# Patient Record
Sex: MALE | HISPANIC OR LATINO | Employment: FULL TIME | URBAN - METROPOLITAN AREA
[De-identification: names, ages, dates, MRNs, and addresses within clinical notes are randomized per-mention and may not be internally consistent; named-entity substitution may affect disease eponyms.]

---

## 2017-09-01 ENCOUNTER — HOSPITAL ENCOUNTER (OUTPATIENT)
Dept: LAB | Facility: MEDICAL CENTER | Age: 37
End: 2017-09-01
Attending: HOSPITALIST
Payer: COMMERCIAL

## 2017-09-01 LAB
EST. AVERAGE GLUCOSE BLD GHB EST-MCNC: 143 MG/DL
HBA1C MFR BLD: 6.6 % (ref 0–5.6)

## 2017-09-01 PROCEDURE — 36415 COLL VENOUS BLD VENIPUNCTURE: CPT

## 2017-09-01 PROCEDURE — 83036 HEMOGLOBIN GLYCOSYLATED A1C: CPT

## 2018-06-27 ENCOUNTER — HOSPITAL ENCOUNTER (OUTPATIENT)
Dept: LAB | Facility: MEDICAL CENTER | Age: 38
End: 2018-06-27
Attending: INTERNAL MEDICINE
Payer: COMMERCIAL

## 2018-06-27 LAB
ALBUMIN SERPL BCP-MCNC: 4.3 G/DL (ref 3.2–4.9)
ALBUMIN/GLOB SERPL: 1.3 G/DL
ALP SERPL-CCNC: 49 U/L (ref 30–99)
ALT SERPL-CCNC: 58 U/L (ref 2–50)
ANION GAP SERPL CALC-SCNC: 8 MMOL/L (ref 0–11.9)
APPEARANCE UR: CLEAR
AST SERPL-CCNC: 31 U/L (ref 12–45)
BACTERIA #/AREA URNS HPF: NEGATIVE /HPF
BASOPHILS # BLD AUTO: 0.7 % (ref 0–1.8)
BASOPHILS # BLD: 0.06 K/UL (ref 0–0.12)
BILIRUB SERPL-MCNC: 1 MG/DL (ref 0.1–1.5)
BILIRUB UR QL STRIP.AUTO: NEGATIVE
BUN SERPL-MCNC: 16 MG/DL (ref 8–22)
CALCIUM SERPL-MCNC: 9.6 MG/DL (ref 8.5–10.5)
CHLORIDE SERPL-SCNC: 104 MMOL/L (ref 96–112)
CO2 SERPL-SCNC: 27 MMOL/L (ref 20–33)
COLOR UR: YELLOW
CREAT SERPL-MCNC: 0.66 MG/DL (ref 0.5–1.4)
EOSINOPHIL # BLD AUTO: 0.3 K/UL (ref 0–0.51)
EOSINOPHIL NFR BLD: 3.3 % (ref 0–6.9)
EPI CELLS #/AREA URNS HPF: ABNORMAL /HPF
ERYTHROCYTE [DISTWIDTH] IN BLOOD BY AUTOMATED COUNT: 41.5 FL (ref 35.9–50)
EST. AVERAGE GLUCOSE BLD GHB EST-MCNC: 154 MG/DL
GLOBULIN SER CALC-MCNC: 3.4 G/DL (ref 1.9–3.5)
GLUCOSE SERPL-MCNC: 155 MG/DL (ref 65–99)
GLUCOSE UR STRIP.AUTO-MCNC: NEGATIVE MG/DL
HBA1C MFR BLD: 7 % (ref 0–5.6)
HCT VFR BLD AUTO: 49.9 % (ref 42–52)
HGB BLD-MCNC: 16.7 G/DL (ref 14–18)
HYALINE CASTS #/AREA URNS LPF: ABNORMAL /LPF
IMM GRANULOCYTES # BLD AUTO: 0.09 K/UL (ref 0–0.11)
IMM GRANULOCYTES NFR BLD AUTO: 1 % (ref 0–0.9)
KETONES UR STRIP.AUTO-MCNC: NEGATIVE MG/DL
LEUKOCYTE ESTERASE UR QL STRIP.AUTO: ABNORMAL
LYMPHOCYTES # BLD AUTO: 2.51 K/UL (ref 1–4.8)
LYMPHOCYTES NFR BLD: 27.6 % (ref 22–41)
MCH RBC QN AUTO: 29.2 PG (ref 27–33)
MCHC RBC AUTO-ENTMCNC: 33.5 G/DL (ref 33.7–35.3)
MCV RBC AUTO: 87.4 FL (ref 81.4–97.8)
MICRO URNS: ABNORMAL
MONOCYTES # BLD AUTO: 0.65 K/UL (ref 0–0.85)
MONOCYTES NFR BLD AUTO: 7.1 % (ref 0–13.4)
NEUTROPHILS # BLD AUTO: 5.49 K/UL (ref 1.82–7.42)
NEUTROPHILS NFR BLD: 60.3 % (ref 44–72)
NITRITE UR QL STRIP.AUTO: NEGATIVE
NRBC # BLD AUTO: 0 K/UL
NRBC BLD-RTO: 0 /100 WBC
PH UR STRIP.AUTO: 5.5 [PH]
PLATELET # BLD AUTO: 238 K/UL (ref 164–446)
PMV BLD AUTO: 11.5 FL (ref 9–12.9)
POTASSIUM SERPL-SCNC: 4.1 MMOL/L (ref 3.6–5.5)
PROT SERPL-MCNC: 7.7 G/DL (ref 6–8.2)
PROT UR QL STRIP: 30 MG/DL
RBC # BLD AUTO: 5.71 M/UL (ref 4.7–6.1)
RBC # URNS HPF: ABNORMAL /HPF
RBC UR QL AUTO: NEGATIVE
SODIUM SERPL-SCNC: 139 MMOL/L (ref 135–145)
SP GR UR STRIP.AUTO: 1.03
UROBILINOGEN UR STRIP.AUTO-MCNC: 0.2 MG/DL
WBC # BLD AUTO: 9.1 K/UL (ref 4.8–10.8)
WBC #/AREA URNS HPF: ABNORMAL /HPF

## 2018-06-27 PROCEDURE — 81001 URINALYSIS AUTO W/SCOPE: CPT

## 2018-06-27 PROCEDURE — 83036 HEMOGLOBIN GLYCOSYLATED A1C: CPT

## 2018-06-27 PROCEDURE — 85025 COMPLETE CBC W/AUTO DIFF WBC: CPT

## 2018-06-27 PROCEDURE — 36415 COLL VENOUS BLD VENIPUNCTURE: CPT

## 2018-06-27 PROCEDURE — 80053 COMPREHEN METABOLIC PANEL: CPT

## 2018-12-06 ENCOUNTER — HOSPITAL ENCOUNTER (EMERGENCY)
Facility: MEDICAL CENTER | Age: 38
End: 2018-12-06
Attending: EMERGENCY MEDICINE
Payer: COMMERCIAL

## 2018-12-06 VITALS
DIASTOLIC BLOOD PRESSURE: 74 MMHG | WEIGHT: 270.73 LBS | TEMPERATURE: 97.5 F | OXYGEN SATURATION: 98 % | HEART RATE: 91 BPM | BODY MASS INDEX: 42.49 KG/M2 | RESPIRATION RATE: 16 BRPM | SYSTOLIC BLOOD PRESSURE: 121 MMHG | HEIGHT: 67 IN

## 2018-12-06 DIAGNOSIS — I83.899 BLEEDING FROM VARICOSE VEIN: ICD-10-CM

## 2018-12-06 PROCEDURE — 99283 EMERGENCY DEPT VISIT LOW MDM: CPT

## 2018-12-06 RX ORDER — LISINOPRIL 10 MG/1
10 TABLET ORAL DAILY
COMMUNITY

## 2018-12-06 ASSESSMENT — PAIN SCALES - GENERAL
PAINLEVEL_OUTOF10: 0

## 2018-12-06 ASSESSMENT — LIFESTYLE VARIABLES: DO YOU DRINK ALCOHOL: NO

## 2018-12-06 NOTE — ED NOTES
Pt given discharge instructions and wound care instructions, he verbalizes understanding of instructions and follow up plan

## 2018-12-06 NOTE — ED PROVIDER NOTES
"ER Provider Note     Scribed for Chaz Lopez M.D. by Emy Collado. 12/6/2018, 3:28 AM.    Primary Care Provider: James Raymond M.D.  Means of Arrival: walk in    History obtained from: Patient  History limited by: None     CHIEF COMPLAINT  Chief Complaint   Patient presents with   • Open Wound     burst vericose vein       HPI  Bautista Mcdermott is a 38 y.o. male who presents to the Emergency Department with complaints of an open wound to his left lower extremity onset tonight. He has varicose veins and notes that his leg was bleeding yesterday and he controlled it with direct pressure and a dressing. Tonight, his leg began to bleed in abundance, so he came here for evaluation. He reports that his primary care physician suggested that he visits a vascular specialist, but he has not yet.       REVIEW OF SYSTEMS  See HPI for further details. All other systems are negative.     PAST MEDICAL HISTORY   has a past medical history of Diabetes (HCC); Hyperlipemia; and Hypertension.    SURGICAL HISTORY  patient denies any surgical history    SOCIAL HISTORY  Social History   Substance Use Topics   • Smoking status: Never Smoker   • Smokeless tobacco: Never Used   • Alcohol use Yes      Comment: rarely      History   Drug Use No       FAMILY HISTORY  History reviewed. No pertinent family history.    CURRENT MEDICATIONS  Home Medications     Reviewed by Rogelio Lewis R.N. (Registered Nurse) on 12/06/18 at 0301  Med List Status: Not Addressed   Medication Last Dose Status   lisinopril (PRINIVIL) 10 MG Tab  Active   metFORMIN (GLUCOPHAGE) 500 MG Tab  Active                ALLERGIES  No Known Allergies    PHYSICAL EXAM  VITAL SIGNS: /71   Pulse 92   Temp 36.2 °C (97.2 °F) (Temporal)   Resp 14   Ht 1.702 m (5' 7\")   Wt 122.8 kg (270 lb 11.6 oz)   SpO2 96%   BMI 42.40 kg/m²      Constitutional: Alert in no apparent distress.  HENT: Normocephalic, Atraumatic, Bilateral external ears normal. Nose " normal.   Eyes: Pupils are equal and reactive. Conjunctiva normal, non-icteric.   Heart: Regular rate and rythm, no murmurs.  Varicose veins to bilateral lower extremities, worse on the left  Lungs: Clear to auscultation bilaterally.  Skin: Warm, Dry, No erythema, No rash. Area of skin breakdown that is circular over one of the varicose veins which was apparently the source of bleeding, controlled at this point.   Neurologic: Alert, Grossly non-focal.   Psychiatric: Affect normal, Judgment normal, Mood normal, Appears appropriate and not intoxicated.       COURSE & MEDICAL DECISION MAKING  Pertinent Labs & Imaging studies reviewed. (See chart for details)    This is a 38 y.o. male that presents with bleeding from a varicose vein in his left lower extremity.  The bleeding stopped at this time.  I can feel there is a small ulceration where the bleeding occurred.  Given the fact there is no active bleeding at this time.  I will rewrap the wound.  I will also teach the patient how to stop the bleeding the future and will give the patient resources.  He is not tachycardic nor does he appear anemic on exam therefore I do not believe the labs are necessary..     3:28 AM - Patient seen and examined at bedside. I discussed the plan for discharge with follow up instructions.       FINAL IMPRESSION  1. Bleeding from varicose vein          FOLLOW UP  James Raymond M.D.  [address] [address] [facility]  [address]  [facility] [place] [address]  [phone]    In 2 days          -DISCHARGE-    FINAL IMPRESSION  1. Bleeding from varicose vein          Emy WINSTON ([place]), am scribing for, and in the presence of, Chaz Lopez M.D..    Electronically signed by: Emy Collado ([place]laquita), 12/6/2018    IChaz M.D. personally performed the services described in this documentation, as scribed by Emy Collado in my presence, and it is both accurate and complete.     E    The note accurately reflects work and decisions made by me.  Chaz JOHNSON  John  12/6/2018  5:36 AM

## 2018-12-06 NOTE — ED TRIAGE NOTES
"Chief Complaint   Patient presents with   • Open Wound     burst vericose vein     Pt in wheelchair to triage with above complaint.  Pt states he had a varicose vein in his left calf burst and begin bleeding yesterday, become controlled, and then begin bleeding again tonight.  Bleeding controlled in triage.      Pt returned to [facility], educated on triage process, and to inform staff of any changes or concerns.    Blood pressure 120/71, pulse 92, temperature 36.2 °C (97.2 °F), temperature source Temporal, resp. rate 14, height 1.702 m (5' 7\"), weight 122.8 kg (270 lb 11.6 oz), SpO2 96 %.      "

## 2023-08-10 ENCOUNTER — APPOINTMENT (OUTPATIENT)
Dept: RADIOLOGY | Facility: MEDICAL CENTER | Age: 43
End: 2023-08-10
Attending: EMERGENCY MEDICINE
Payer: COMMERCIAL

## 2023-08-10 ENCOUNTER — HOSPITAL ENCOUNTER (EMERGENCY)
Facility: MEDICAL CENTER | Age: 43
End: 2023-08-11
Attending: EMERGENCY MEDICINE
Payer: COMMERCIAL

## 2023-08-10 DIAGNOSIS — R73.9 HYPERGLYCEMIA: ICD-10-CM

## 2023-08-10 DIAGNOSIS — R10.13 EPIGASTRIC PAIN: ICD-10-CM

## 2023-08-10 DIAGNOSIS — I47.10 SUPRAVENTRICULAR TACHYCARDIA (HCC): ICD-10-CM

## 2023-08-10 DIAGNOSIS — R10.30 LOWER ABDOMINAL PAIN: ICD-10-CM

## 2023-08-10 DIAGNOSIS — R11.2 NAUSEA AND VOMITING, UNSPECIFIED VOMITING TYPE: ICD-10-CM

## 2023-08-10 DIAGNOSIS — Z91.148 NON COMPLIANCE W MEDICATION REGIMEN: ICD-10-CM

## 2023-08-10 LAB
ALBUMIN SERPL BCP-MCNC: 4.2 G/DL (ref 3.2–4.9)
ALBUMIN/GLOB SERPL: 1.1 G/DL
ALP SERPL-CCNC: 151 U/L (ref 30–99)
ALT SERPL-CCNC: 77 U/L (ref 2–50)
ANION GAP SERPL CALC-SCNC: 20 MMOL/L (ref 7–16)
AST SERPL-CCNC: 43 U/L (ref 12–45)
B-OH-BUTYR SERPL-MCNC: 1.92 MMOL/L (ref 0.02–0.27)
BASE EXCESS BLDV CALC-SCNC: -3 MMOL/L
BASOPHILS # BLD AUTO: 0.3 % (ref 0–1.8)
BASOPHILS # BLD: 0.05 K/UL (ref 0–0.12)
BILIRUB SERPL-MCNC: 1 MG/DL (ref 0.1–1.5)
BODY TEMPERATURE: 37.6 CENTIGRADE
BUN SERPL-MCNC: 11 MG/DL (ref 8–22)
CALCIUM ALBUM COR SERPL-MCNC: 9.4 MG/DL (ref 8.5–10.5)
CALCIUM SERPL-MCNC: 9.6 MG/DL (ref 8.5–10.5)
CHLORIDE SERPL-SCNC: 97 MMOL/L (ref 96–112)
CO2 SERPL-SCNC: 15 MMOL/L (ref 20–33)
CREAT SERPL-MCNC: 0.44 MG/DL (ref 0.5–1.4)
EKG IMPRESSION: NORMAL
EKG IMPRESSION: NORMAL
EOSINOPHIL # BLD AUTO: 0.02 K/UL (ref 0–0.51)
EOSINOPHIL NFR BLD: 0.1 % (ref 0–6.9)
ERYTHROCYTE [DISTWIDTH] IN BLOOD BY AUTOMATED COUNT: 39.8 FL (ref 35.9–50)
GFR SERPLBLD CREATININE-BSD FMLA CKD-EPI: 137 ML/MIN/1.73 M 2
GLOBULIN SER CALC-MCNC: 3.8 G/DL (ref 1.9–3.5)
GLUCOSE SERPL-MCNC: 387 MG/DL (ref 65–99)
HCO3 BLDV-SCNC: 20 MMOL/L (ref 24–28)
HCT VFR BLD AUTO: 51.7 % (ref 42–52)
HGB BLD-MCNC: 17.2 G/DL (ref 14–18)
IMM GRANULOCYTES # BLD AUTO: 0.1 K/UL (ref 0–0.11)
IMM GRANULOCYTES NFR BLD AUTO: 0.6 % (ref 0–0.9)
INHALED O2 FLOW RATE: ABNORMAL L/MIN
LIPASE SERPL-CCNC: 28 U/L (ref 11–82)
LYMPHOCYTES # BLD AUTO: 2.37 K/UL (ref 1–4.8)
LYMPHOCYTES NFR BLD: 14.9 % (ref 22–41)
MCH RBC QN AUTO: 28.2 PG (ref 27–33)
MCHC RBC AUTO-ENTMCNC: 33.3 G/DL (ref 32.3–36.5)
MCV RBC AUTO: 84.9 FL (ref 81.4–97.8)
MONOCYTES # BLD AUTO: 0.88 K/UL (ref 0–0.85)
MONOCYTES NFR BLD AUTO: 5.5 % (ref 0–13.4)
NEUTROPHILS # BLD AUTO: 12.51 K/UL (ref 1.82–7.42)
NEUTROPHILS NFR BLD: 78.6 % (ref 44–72)
NRBC # BLD AUTO: 0 K/UL
NRBC BLD-RTO: 0 /100 WBC (ref 0–0.2)
PCO2 BLDV: 31 MMHG (ref 41–51)
PCO2 TEMP ADJ BLDV: 31.8 MMHG (ref 41–51)
PH BLDV: 7.44 [PH] (ref 7.31–7.45)
PH TEMP ADJ BLDV: 7.43 [PH] (ref 7.31–7.45)
PLATELET # BLD AUTO: 267 K/UL (ref 164–446)
PMV BLD AUTO: 12.4 FL (ref 9–12.9)
PO2 BLDV: 58 MMHG (ref 25–40)
PO2 TEMP ADJ BLDV: 60.5 MMHG (ref 25–40)
POTASSIUM SERPL-SCNC: 4.7 MMOL/L (ref 3.6–5.5)
PROT SERPL-MCNC: 8 G/DL (ref 6–8.2)
RBC # BLD AUTO: 6.09 M/UL (ref 4.7–6.1)
SAO2 % BLDV: 89.8 %
SODIUM SERPL-SCNC: 132 MMOL/L (ref 135–145)
WBC # BLD AUTO: 15.9 K/UL (ref 4.8–10.8)

## 2023-08-10 PROCEDURE — 96376 TX/PRO/DX INJ SAME DRUG ADON: CPT

## 2023-08-10 PROCEDURE — 93005 ELECTROCARDIOGRAM TRACING: CPT

## 2023-08-10 PROCEDURE — 92960 CARDIOVERSION ELECTRIC EXT: CPT

## 2023-08-10 PROCEDURE — 76705 ECHO EXAM OF ABDOMEN: CPT

## 2023-08-10 PROCEDURE — 700111 HCHG RX REV CODE 636 W/ 250 OVERRIDE (IP): Performed by: EMERGENCY MEDICINE

## 2023-08-10 PROCEDURE — 82803 BLOOD GASES ANY COMBINATION: CPT

## 2023-08-10 PROCEDURE — 71045 X-RAY EXAM CHEST 1 VIEW: CPT

## 2023-08-10 PROCEDURE — 700117 HCHG RX CONTRAST REV CODE 255: Performed by: EMERGENCY MEDICINE

## 2023-08-10 PROCEDURE — 99284 EMERGENCY DEPT VISIT MOD MDM: CPT

## 2023-08-10 PROCEDURE — 700105 HCHG RX REV CODE 258: Performed by: EMERGENCY MEDICINE

## 2023-08-10 PROCEDURE — 80053 COMPREHEN METABOLIC PANEL: CPT

## 2023-08-10 PROCEDURE — 74177 CT ABD & PELVIS W/CONTRAST: CPT

## 2023-08-10 PROCEDURE — 96374 THER/PROPH/DIAG INJ IV PUSH: CPT

## 2023-08-10 PROCEDURE — 83690 ASSAY OF LIPASE: CPT

## 2023-08-10 PROCEDURE — 85025 COMPLETE CBC W/AUTO DIFF WBC: CPT

## 2023-08-10 PROCEDURE — 82010 KETONE BODYS QUAN: CPT

## 2023-08-10 PROCEDURE — 96375 TX/PRO/DX INJ NEW DRUG ADDON: CPT

## 2023-08-10 PROCEDURE — 36415 COLL VENOUS BLD VENIPUNCTURE: CPT

## 2023-08-10 PROCEDURE — 700111 HCHG RX REV CODE 636 W/ 250 OVERRIDE (IP): Mod: JZ

## 2023-08-10 PROCEDURE — 93005 ELECTROCARDIOGRAM TRACING: CPT | Performed by: EMERGENCY MEDICINE

## 2023-08-10 RX ORDER — ADENOSINE 3 MG/ML
6 INJECTION, SOLUTION INTRAVENOUS ONCE
Status: COMPLETED | OUTPATIENT
Start: 2023-08-10 | End: 2023-08-10

## 2023-08-10 RX ORDER — SODIUM CHLORIDE 9 MG/ML
1000 INJECTION, SOLUTION INTRAVENOUS ONCE
Status: COMPLETED | OUTPATIENT
Start: 2023-08-10 | End: 2023-08-10

## 2023-08-10 RX ORDER — ADENOSINE 3 MG/ML
INJECTION, SOLUTION INTRAVENOUS
Status: COMPLETED
Start: 2023-08-10 | End: 2023-08-10

## 2023-08-10 RX ORDER — ADENOSINE 3 MG/ML
12 INJECTION, SOLUTION INTRAVENOUS
Status: COMPLETED | OUTPATIENT
Start: 2023-08-10 | End: 2023-08-10

## 2023-08-10 RX ORDER — HYDROMORPHONE HYDROCHLORIDE 1 MG/ML
1 INJECTION, SOLUTION INTRAMUSCULAR; INTRAVENOUS; SUBCUTANEOUS ONCE
Status: COMPLETED | OUTPATIENT
Start: 2023-08-10 | End: 2023-08-10

## 2023-08-10 RX ADMIN — HYDROMORPHONE HYDROCHLORIDE 1 MG: 1 INJECTION, SOLUTION INTRAMUSCULAR; INTRAVENOUS; SUBCUTANEOUS at 22:43

## 2023-08-10 RX ADMIN — SODIUM CHLORIDE 1000 ML: 9 INJECTION, SOLUTION INTRAVENOUS at 18:16

## 2023-08-10 RX ADMIN — ADENOSINE 6 MG: 3 INJECTION INTRAVENOUS at 18:15

## 2023-08-10 RX ADMIN — ADENOSINE 12 MG: 3 INJECTION INTRAVENOUS at 18:18

## 2023-08-10 RX ADMIN — IOHEXOL 100 ML: 350 INJECTION, SOLUTION INTRAVENOUS at 22:05

## 2023-08-10 RX ADMIN — ADENOSINE 12 MG: 3 INJECTION, SOLUTION INTRAVENOUS at 18:18

## 2023-08-11 ENCOUNTER — APPOINTMENT (OUTPATIENT)
Dept: RADIOLOGY | Facility: MEDICAL CENTER | Age: 43
DRG: 389 | End: 2023-08-11
Attending: EMERGENCY MEDICINE
Payer: COMMERCIAL

## 2023-08-11 ENCOUNTER — HOSPITAL ENCOUNTER (INPATIENT)
Facility: MEDICAL CENTER | Age: 43
LOS: 3 days | DRG: 389 | End: 2023-08-15
Attending: EMERGENCY MEDICINE | Admitting: STUDENT IN AN ORGANIZED HEALTH CARE EDUCATION/TRAINING PROGRAM
Payer: COMMERCIAL

## 2023-08-11 VITALS
OXYGEN SATURATION: 93 % | TEMPERATURE: 98.1 F | BODY MASS INDEX: 43.32 KG/M2 | WEIGHT: 260 LBS | SYSTOLIC BLOOD PRESSURE: 144 MMHG | HEART RATE: 91 BPM | HEIGHT: 65 IN | DIASTOLIC BLOOD PRESSURE: 81 MMHG | RESPIRATION RATE: 16 BRPM

## 2023-08-11 DIAGNOSIS — E11.9 TYPE 2 DIABETES MELLITUS WITHOUT COMPLICATION, WITH LONG-TERM CURRENT USE OF INSULIN (HCC): ICD-10-CM

## 2023-08-11 DIAGNOSIS — K56.609 SMALL BOWEL OBSTRUCTION (HCC): ICD-10-CM

## 2023-08-11 DIAGNOSIS — Z79.4 TYPE 2 DIABETES MELLITUS WITHOUT COMPLICATION, WITH LONG-TERM CURRENT USE OF INSULIN (HCC): ICD-10-CM

## 2023-08-11 LAB
ALBUMIN SERPL BCP-MCNC: 4.3 G/DL (ref 3.2–4.9)
ALBUMIN/GLOB SERPL: 1.2 G/DL
ALP SERPL-CCNC: 152 U/L (ref 30–99)
ALT SERPL-CCNC: 52 U/L (ref 2–50)
ANION GAP SERPL CALC-SCNC: 22 MMOL/L (ref 7–16)
APPEARANCE UR: CLEAR
AST SERPL-CCNC: 22 U/L (ref 12–45)
BACTERIA #/AREA URNS HPF: NEGATIVE /HPF
BASOPHILS # BLD AUTO: 0.3 % (ref 0–1.8)
BASOPHILS # BLD: 0.06 K/UL (ref 0–0.12)
BILIRUB SERPL-MCNC: 1.4 MG/DL (ref 0.1–1.5)
BILIRUB UR QL STRIP.AUTO: NEGATIVE
BUN SERPL-MCNC: 15 MG/DL (ref 8–22)
CALCIUM ALBUM COR SERPL-MCNC: 9.5 MG/DL (ref 8.5–10.5)
CALCIUM SERPL-MCNC: 9.7 MG/DL (ref 8.5–10.5)
CHLORIDE SERPL-SCNC: 92 MMOL/L (ref 96–112)
CO2 SERPL-SCNC: 20 MMOL/L (ref 20–33)
COLOR UR: YELLOW
CREAT SERPL-MCNC: 0.47 MG/DL (ref 0.5–1.4)
EOSINOPHIL # BLD AUTO: 0.01 K/UL (ref 0–0.51)
EOSINOPHIL NFR BLD: 0 % (ref 0–6.9)
EPI CELLS #/AREA URNS HPF: NEGATIVE /HPF
ERYTHROCYTE [DISTWIDTH] IN BLOOD BY AUTOMATED COUNT: 39.6 FL (ref 35.9–50)
GFR SERPLBLD CREATININE-BSD FMLA CKD-EPI: 134 ML/MIN/1.73 M 2
GLOBULIN SER CALC-MCNC: 3.6 G/DL (ref 1.9–3.5)
GLUCOSE BLD STRIP.AUTO-MCNC: 335 MG/DL (ref 65–99)
GLUCOSE SERPL-MCNC: 369 MG/DL (ref 65–99)
GLUCOSE UR STRIP.AUTO-MCNC: >=1000 MG/DL
HCT VFR BLD AUTO: 52.4 % (ref 42–52)
HGB BLD-MCNC: 17.7 G/DL (ref 14–18)
HYALINE CASTS #/AREA URNS LPF: ABNORMAL /LPF
IMM GRANULOCYTES # BLD AUTO: 0.15 K/UL (ref 0–0.11)
IMM GRANULOCYTES NFR BLD AUTO: 0.7 % (ref 0–0.9)
KETONES UR STRIP.AUTO-MCNC: >=160 MG/DL
LACTATE SERPL-SCNC: 2.2 MMOL/L (ref 0.5–2)
LEUKOCYTE ESTERASE UR QL STRIP.AUTO: NEGATIVE
LIPASE SERPL-CCNC: 25 U/L (ref 11–82)
LYMPHOCYTES # BLD AUTO: 2.13 K/UL (ref 1–4.8)
LYMPHOCYTES NFR BLD: 9.3 % (ref 22–41)
MCH RBC QN AUTO: 28.3 PG (ref 27–33)
MCHC RBC AUTO-ENTMCNC: 33.8 G/DL (ref 32.3–36.5)
MCV RBC AUTO: 83.8 FL (ref 81.4–97.8)
MICRO URNS: ABNORMAL
MONOCYTES # BLD AUTO: 1.17 K/UL (ref 0–0.85)
MONOCYTES NFR BLD AUTO: 5.1 % (ref 0–13.4)
NEUTROPHILS # BLD AUTO: 19.34 K/UL (ref 1.82–7.42)
NEUTROPHILS NFR BLD: 84.6 % (ref 44–72)
NITRITE UR QL STRIP.AUTO: NEGATIVE
NRBC # BLD AUTO: 0 K/UL
NRBC BLD-RTO: 0 /100 WBC (ref 0–0.2)
PH UR STRIP.AUTO: 5 [PH] (ref 5–8)
PLATELET # BLD AUTO: 335 K/UL (ref 164–446)
PMV BLD AUTO: 12.1 FL (ref 9–12.9)
POTASSIUM SERPL-SCNC: 3.8 MMOL/L (ref 3.6–5.5)
PROT SERPL-MCNC: 7.9 G/DL (ref 6–8.2)
PROT UR QL STRIP: 300 MG/DL
RBC # BLD AUTO: 6.25 M/UL (ref 4.7–6.1)
RBC # URNS HPF: ABNORMAL /HPF
RBC UR QL AUTO: NEGATIVE
SODIUM SERPL-SCNC: 134 MMOL/L (ref 135–145)
SP GR UR STRIP.AUTO: 1.04
UROBILINOGEN UR STRIP.AUTO-MCNC: 0.2 MG/DL
WBC # BLD AUTO: 22.9 K/UL (ref 4.8–10.8)
WBC #/AREA URNS HPF: ABNORMAL /HPF

## 2023-08-11 PROCEDURE — 36415 COLL VENOUS BLD VENIPUNCTURE: CPT

## 2023-08-11 PROCEDURE — 84145 PROCALCITONIN (PCT): CPT

## 2023-08-11 PROCEDURE — 81001 URINALYSIS AUTO W/SCOPE: CPT

## 2023-08-11 PROCEDURE — 71275 CT ANGIOGRAPHY CHEST: CPT

## 2023-08-11 PROCEDURE — 700117 HCHG RX CONTRAST REV CODE 255: Performed by: EMERGENCY MEDICINE

## 2023-08-11 PROCEDURE — 99285 EMERGENCY DEPT VISIT HI MDM: CPT

## 2023-08-11 PROCEDURE — 80053 COMPREHEN METABOLIC PANEL: CPT

## 2023-08-11 PROCEDURE — 83690 ASSAY OF LIPASE: CPT

## 2023-08-11 PROCEDURE — 99254 IP/OBS CNSLTJ NEW/EST MOD 60: CPT | Performed by: SURGERY

## 2023-08-11 PROCEDURE — 96376 TX/PRO/DX INJ SAME DRUG ADON: CPT

## 2023-08-11 PROCEDURE — 96375 TX/PRO/DX INJ NEW DRUG ADDON: CPT

## 2023-08-11 PROCEDURE — 74177 CT ABD & PELVIS W/CONTRAST: CPT

## 2023-08-11 PROCEDURE — 82962 GLUCOSE BLOOD TEST: CPT

## 2023-08-11 PROCEDURE — 700111 HCHG RX REV CODE 636 W/ 250 OVERRIDE (IP): Mod: JZ | Performed by: EMERGENCY MEDICINE

## 2023-08-11 PROCEDURE — 96374 THER/PROPH/DIAG INJ IV PUSH: CPT

## 2023-08-11 PROCEDURE — 83605 ASSAY OF LACTIC ACID: CPT

## 2023-08-11 PROCEDURE — 85025 COMPLETE CBC W/AUTO DIFF WBC: CPT

## 2023-08-11 PROCEDURE — 700105 HCHG RX REV CODE 258: Mod: JZ | Performed by: EMERGENCY MEDICINE

## 2023-08-11 RX ORDER — OMEPRAZOLE 20 MG/1
20 CAPSULE, DELAYED RELEASE ORAL DAILY
Qty: 90 CAPSULE | Refills: 0 | Status: SHIPPED | OUTPATIENT
Start: 2023-08-11 | End: 2023-11-09

## 2023-08-11 RX ORDER — ONDANSETRON 2 MG/ML
4 INJECTION INTRAMUSCULAR; INTRAVENOUS ONCE
Status: COMPLETED | OUTPATIENT
Start: 2023-08-11 | End: 2023-08-11

## 2023-08-11 RX ORDER — ONDANSETRON 2 MG/ML
4 INJECTION INTRAMUSCULAR; INTRAVENOUS ONCE
Status: COMPLETED | OUTPATIENT
Start: 2023-08-12 | End: 2023-08-12

## 2023-08-11 RX ORDER — MORPHINE SULFATE 4 MG/ML
4 INJECTION INTRAVENOUS ONCE
Status: COMPLETED | OUTPATIENT
Start: 2023-08-11 | End: 2023-08-11

## 2023-08-11 RX ORDER — MORPHINE SULFATE 4 MG/ML
4 INJECTION INTRAVENOUS ONCE
Status: DISCONTINUED | OUTPATIENT
Start: 2023-08-12 | End: 2023-08-11

## 2023-08-11 RX ORDER — ONDANSETRON 4 MG/1
4 TABLET, ORALLY DISINTEGRATING ORAL EVERY 6 HOURS PRN
Qty: 10 TABLET | Refills: 0 | Status: SHIPPED | OUTPATIENT
Start: 2023-08-11

## 2023-08-11 RX ORDER — SODIUM CHLORIDE, SODIUM LACTATE, POTASSIUM CHLORIDE, AND CALCIUM CHLORIDE .6; .31; .03; .02 G/100ML; G/100ML; G/100ML; G/100ML
30 INJECTION, SOLUTION INTRAVENOUS ONCE
Status: COMPLETED | OUTPATIENT
Start: 2023-08-11 | End: 2023-08-11

## 2023-08-11 RX ORDER — HYDROMORPHONE HYDROCHLORIDE 1 MG/ML
0.5 INJECTION, SOLUTION INTRAMUSCULAR; INTRAVENOUS; SUBCUTANEOUS ONCE
Status: COMPLETED | OUTPATIENT
Start: 2023-08-12 | End: 2023-08-12

## 2023-08-11 RX ADMIN — MORPHINE SULFATE 4 MG: 4 INJECTION, SOLUTION INTRAMUSCULAR; INTRAVENOUS at 21:52

## 2023-08-11 RX ADMIN — SODIUM CHLORIDE, POTASSIUM CHLORIDE, SODIUM LACTATE AND CALCIUM CHLORIDE 1845 ML: 600; 310; 30; 20 INJECTION, SOLUTION INTRAVENOUS at 21:53

## 2023-08-11 RX ADMIN — ONDANSETRON 4 MG: 2 INJECTION INTRAMUSCULAR; INTRAVENOUS at 21:52

## 2023-08-11 RX ADMIN — IOHEXOL 100 ML: 350 INJECTION, SOLUTION INTRAVENOUS at 23:45

## 2023-08-11 RX ADMIN — IOHEXOL 12 ML: 240 INJECTION, SOLUTION INTRATHECAL; INTRAVASCULAR; INTRAVENOUS; ORAL at 23:45

## 2023-08-11 ASSESSMENT — FIBROSIS 4 INDEX: FIB4 SCORE: 0.73

## 2023-08-11 NOTE — ED NOTES
MD at bedside.    Rounded on Pt.    Updated Pt on plan of care. Pt verbalized understanding.  No acute distress at this time.  Will continue to monitor.

## 2023-08-11 NOTE — ED TRIAGE NOTES
".  Chief Complaint   Patient presents with    Abdominal Pain     Severe RUQ abdominal pain that started this morning. Pain worsens with deep breath.          41 yo male BIB family to triage for above complaint. EKG completed on arrival. Patient appears to be in SVT in the 180's.      Patient taken to [facility] with family.     BP (!) 127/97   Pulse (!) 189   Temp (!) 35.7 °C (96.2 °F) (Temporal)   Resp 18   Ht 1.651 m (5' 5\")   Wt 118 kg (260 lb)   SpO2 96%   BMI 43.27 kg/m²     "

## 2023-08-11 NOTE — ED NOTES
Rounded on Pt.    Pt requesting pain medication; rates his abdominal pain at 9 of 10.    Informed MD.    Updated Pt on plan of care. Pt verbalized understanding.  No acute distress at this time.  Will continue to monitor.

## 2023-08-11 NOTE — DISCHARGE SUMMARY
"  ED Observation Discharge Summary    Patient:Bautista Mcdermott  Patient : 1980  Patient MRN: [id number]  Patient PCP: James Raymond M.D.    Admit Date: 8/10/2023  Discharge Date and Time: 23 1:25 AM  Discharge Diagnosis: Gastritis, hyperglycemia with medication noncompliance  Discharge Attending: Adan Dawn M.D.  Discharge Service: ED Observation    ED Course  Bautista is a 42 y.o. male who was evaluated at [facility] patient presenting the emergency department with abdominal pain.  Please see my colleagues initial H&P and work-up.  The time of signout CT imaging was pending.  CT imaging has resulted and is largely unremarkable other than the finding of likely gastritis.  I do believe this may be partially causative to the patient's current complaints.  Again he is hyperglycemic secondary to medication noncompliance.  He is not not in DKA.  He states that he is able to resume blood sugar monitoring and his medications as previously prescribed.  I have additionally directed him to for a diet and gastritis care.  He is also been referred to GI for outpatient care and follow-up.  Lastly he is understanding return precautions here to the ER with any changes or worsening.        Discharge Exam:  BP (!) 144/81   Pulse 91   Temp 36.7 °C (98.1 °F) (Temporal)   Resp 16   Ht 1.651 m (5' 5\")   Wt 118 kg (260 lb)   SpO2 93%   BMI 43.27 kg/m² .    Constitutional: Awake and alert. Nontoxic  HENT:  Grossly normal  Eyes: Grossly normal  Neck: Normal range of motion  Cardiovascular: Normal heart rate   Thorax & Lungs: No respiratory distress  Abdomen: Nontender  Skin:  No pathologic rash.   Extremities: Well perfused  Psychiatric: Affect normal    Labs  Results for orders placed or performed during the hospital encounter of 08/10/23   CBC WITH DIFFERENTIAL   Result Value Ref Range    WBC 15.9 (H) 4.8 - 10.8 K/uL    RBC 6.09 4.70 - 6.10 M/uL    Hemoglobin 17.2 14.0 - 18.0 g/dL    Hematocrit 51.7 42.0 - 52.0 " %    MCV 84.9 81.4 - 97.8 fL    MCH 28.2 27.0 - 33.0 pg    MCHC 33.3 32.3 - 36.5 g/dL    RDW 39.8 35.9 - 50.0 fL    Platelet Count 267 164 - 446 K/uL    MPV 12.4 9.0 - 12.9 fL    Neutrophils-Polys 78.60 (H) 44.00 - 72.00 %    Lymphocytes 14.90 (L) 22.00 - 41.00 %    Monocytes 5.50 0.00 - 13.40 %    Eosinophils 0.10 0.00 - 6.90 %    Basophils 0.30 0.00 - 1.80 %    Immature Granulocytes 0.60 0.00 - 0.90 %    Nucleated RBC 0.00 0.00 - 0.20 /100 WBC    Neutrophils (Absolute) 12.51 (H) 1.82 - 7.42 K/uL    Lymphs (Absolute) 2.37 1.00 - 4.80 K/uL    Monos (Absolute) 0.88 (H) 0.00 - 0.85 K/uL    Eos (Absolute) 0.02 0.00 - 0.51 K/uL    Baso (Absolute) 0.05 0.00 - 0.12 K/uL    Immature Granulocytes (abs) 0.10 0.00 - 0.11 K/uL    NRBC (Absolute) 0.00 K/uL   COMP METABOLIC PANEL   Result Value Ref Range    Sodium 132 (L) 135 - 145 mmol/L    Potassium 4.7 3.6 - 5.5 mmol/L    Chloride 97 96 - 112 mmol/L    Co2 15 (L) 20 - 33 mmol/L    Anion Gap 20.0 (H) 7.0 - 16.0    Glucose 387 (H) 65 - 99 mg/dL    Bun 11 8 - 22 mg/dL    Creatinine 0.44 (L) 0.50 - 1.40 mg/dL    Calcium 9.6 8.5 - 10.5 mg/dL    Correct Calcium 9.4 8.5 - 10.5 mg/dL    AST(SGOT) 43 12 - 45 U/L    ALT(SGPT) 77 (H) 2 - 50 U/L    Alkaline Phosphatase 151 (H) 30 - 99 U/L    Total Bilirubin 1.0 0.1 - 1.5 mg/dL    Albumin 4.2 3.2 - 4.9 g/dL    Total Protein 8.0 6.0 - 8.2 g/dL    Globulin 3.8 (H) 1.9 - 3.5 g/dL    A-G Ratio 1.1 g/dL   LIPASE   Result Value Ref Range    Lipase 28 11 - 82 U/L   ESTIMATED GFR   Result Value Ref Range    GFR (CKD-EPI) 137 >60 mL/min/1.73 m 2   BETA-HYDROXYBUTYRIC ACID   Result Value Ref Range    beta-Hydroxybutyric Acid 1.92 (H) 0.02 - 0.27 mmol/L   VENOUS BLOOD GAS   Result Value Ref Range    Venous Bg Ph 7.44 7.31 - 7.45    Venous Bg Ph Temp Corrected 7.43 7.31 - 7.45    Venous Bg Pco2 31.0 (L) 41.0 - 51.0 mmHg    Venous Bg Pco2 Temp Corrected 31.8 (L) 41.0 - 51.0 mmHg    Venous Bg Po2 58.0 (H) 25.0 - 40.0 mmHg    Venous Bg Po2 Temp  Corrected 60.5 (H) 25.0 - 40.0 mmHg    Venous Bg O2 Saturation 89.8 %    Venous Bg Hco3 20 (L) 24 - 28 mmol/L    Venous Bg Base Excess -3 mmol/L    Body Temp 37.6 Centigrade    O2 Therapy none    EKG (NOW)   Result Value Ref Range    Report       [facility] Emergency Dept.    Test Date:  2023-08-10  Pt Name:    [facility]            Department: ER  MRN:        [id number]                      Room:  Gender:     Male                         Technician: [id number]  :        1980                   Requested By:ER TRIAGE PROTOCOL  Order #:    [id number]                    Reading MD: MARIA ALEJANDRA CASTILLO MD    Measurements  Intervals                                Axis  Rate:       177                          P:          0  CT:         0                            QRS:        101  QRSD:       89                           T:          -24  QT:         281  QTc:        483    Interpretive Statements  , right axis deviation, no concerning ST segment abnormalities.  Inverted T waves in lead III and aVF  I independently interpreted this EKG  Electronically Signed On 08- 18:51:20 PDT by MARIA ALEJANDRA CASTILLO MD     EKG   Result Value Ref Range    Report       [facility] Emergency Dept.    Test Date:  2023-08-10  Pt Name:    [facility]            Department: ER  MRN:        [id number]                      Room:       RD 11  Gender:     Male                         Technician: [id number]  :        1980                   Requested By:MARIA ALEJANDRA CASTILLO  Order #:    [id number]                    Reading MD: MARIA ALEJANDRA CASTILLO MD    Measurements  Intervals                                Axis  Rate:       104                          P:          32  CT:         161                          QRS:        97  QRSD:       92                           T:          6  QT:         348  QTc:        458    Interpretive Statements  Sinus tachycardia  Borderline right axis  deviation  Compared to ECG 08/10/2023 17:59:10  Resolved SVT  I independently interpreted this EKG  Electronically Signed On 08- 18:51:41 PDT by MARIA ALEJANDRA CASTILLO MD     POCT glucose device results   Result Value Ref Range    POC Glucose, Blood 335 (H) 65 - 99 mg/dL       Radiology  CT-ABDOMEN-PELVIS WITH   Final Result      1.  Hepatosplenomegaly with hepatic steatosis.      2.  Gastroenteritis      US-RUQ   Final Result      1.  Hepatomegaly with evidence of cirrhotic change and hepatic steatosis.      2.  Sludge noted in the gallbladder.      3.  Mild dilatation of the portal vein suspicious for portal hypertension      DX-CHEST-PORTABLE (1 VIEW)   Final Result      1.  Low lung volumes without definite acute cardiopulmonary abnormality.          Medications:   Discharge Medication List as of 8/11/2023  1:34 AM        START taking these medications    Details   omeprazole (PRILOSEC) 20 MG delayed-release capsule Take 1 Capsule by mouth every day for 90 days., Disp-90 Capsule, R-0, Normal      ondansetron (ZOFRAN ODT) 4 MG TABLET DISPERSIBLE Take 1 Tablet by mouth every 6 hours as needed for Nausea/Vomiting., Disp-10 Tablet, R-0, Normal             My final assessment includes gastritis and hyperglycemia  Upon Reevaluation, the patient's condition has: Improved; and will be discharged.    Patient discharged from ED Observation status at 0 130 (Time) 8/11/2023 (Date).     Total time spent on this ED Observation discharge encounter is > 30 Minutes    Electronically signed by: Adan Dawn M.D., 8/11/2023 1:25 AM

## 2023-08-11 NOTE — ED NOTES
Pt provided discharge instructions and follow-up information. Pt verbalized understanding and all questions answered. PIV removed. Pt ambulated from ED with steady gait carrying all belongings.

## 2023-08-11 NOTE — ED NOTES
Received bedside report; assumed care of Pt.    2 L/min via NC oxygen while sleeping.  No SI precautions.  High fall risk precautions.    Introduced self to Pt; informed him that I am part of his care team.  Rounded on Pt. Updated Pt on plan of care. Pt verbalized understanding.  No acute distress at this time.  Will continue to monitor.

## 2023-08-11 NOTE — ED PROVIDER NOTES
"ED Provider Note    CHIEF COMPLAINT  Chief Complaint   Patient presents with    Abdominal Pain     Severe RUQ abdominal pain that started this morning. Pain worsens with deep breath.        HPI/RENZO  Bautista Mcdermott is a 42 y.o. male who presents for evaluation of right-sided abdominal pain, sent by his PCP via private vehicle with a heart rate of 180.  The patient states that around 2 AM last night he began experiencing right-sided mid abdominal pain.  He had no chest pain shortness of breath or palpitations.  No vomiting or diarrhea.  He comes in through triage where he is found have a heart rate of 180 and is wheeled directly back to room 11 where I was called to emergently evaluate him.  He denies fever.  Denies any history of heart issues.  States has history of diabetes hypertension and hyperlipidemia.    PAST MEDICAL HISTORY   has a past medical history of Diabetes (HCC), Hyperlipemia, and Hypertension.    SURGICAL HISTORY  patient denies any surgical history    FAMILY HISTORY  History reviewed. No pertinent family history.    SOCIAL HISTORY  Social History     Tobacco Use    Smoking status: Never    Smokeless tobacco: Never   Vaping Use    Vaping Use: Never used   Substance and Sexual Activity    Alcohol use: Yes     Comment: rarely    Drug use: No    Sexual activity: Not on file       CURRENT MEDICATIONS  Home Medications       Reviewed by Fifi Mason R.N. (Registered Nurse) on 08/10/23 at 1758  Med List Status: Partial     Medication Last Dose Status   lisinopril (PRINIVIL) 10 MG Tab  Active   metFORMIN (GLUCOPHAGE) 500 MG Tab  Active                    ALLERGIES  No Known Allergies    PHYSICAL EXAM  VITAL SIGNS: BP (!) 127/97   Pulse (!) 189   Temp (!) 35.7 °C (96.2 °F) (Temporal)   Resp 18   Ht 1.651 m (5' 5\")   Wt 118 kg (260 lb)   SpO2 96%   BMI 43.27 kg/m²    Constitutional: Appears ill, awake and alert  HENT: Normocephalic, no obvious evidence of acute trauma.  Eyes: No scleral icterus. " Normal conjunctiva   Thorax & Lungs: Normal nonlabored respirations. I appreciate no wheezing, rhonchi or rales. There is normal air movement.  Upon cardiac ascultation I appreciate a regular heart rhythm and extremely tachycardic rate.   Abdomen: The abdomen is not visibly distended.  Upon palpation he does have some mild to moderate right-sided tenderness.  No rebound or guarding.  Skin: The exposed portions of skin reveal no obvious rash or other abnormalities.  Extremities/Musculoskeletal: Varicosities of the left lower extremity that are nontender.  Neurologic: Alert & oriented. No focal deficits observed.      DIAGNOSTIC STUDIES / PROCEDURES    LABS  Results for orders placed or performed during the hospital encounter of 08/10/23   CBC WITH DIFFERENTIAL   Result Value Ref Range    WBC 15.9 (H) 4.8 - 10.8 K/uL    RBC 6.09 4.70 - 6.10 M/uL    Hemoglobin 17.2 14.0 - 18.0 g/dL    Hematocrit 51.7 42.0 - 52.0 %    MCV 84.9 81.4 - 97.8 fL    MCH 28.2 27.0 - 33.0 pg    MCHC 33.3 32.3 - 36.5 g/dL    RDW 39.8 35.9 - 50.0 fL    Platelet Count 267 164 - 446 K/uL    MPV 12.4 9.0 - 12.9 fL    Neutrophils-Polys 78.60 (H) 44.00 - 72.00 %    Lymphocytes 14.90 (L) 22.00 - 41.00 %    Monocytes 5.50 0.00 - 13.40 %    Eosinophils 0.10 0.00 - 6.90 %    Basophils 0.30 0.00 - 1.80 %    Immature Granulocytes 0.60 0.00 - 0.90 %    Nucleated RBC 0.00 0.00 - 0.20 /100 WBC    Neutrophils (Absolute) 12.51 (H) 1.82 - 7.42 K/uL    Lymphs (Absolute) 2.37 1.00 - 4.80 K/uL    Monos (Absolute) 0.88 (H) 0.00 - 0.85 K/uL    Eos (Absolute) 0.02 0.00 - 0.51 K/uL    Baso (Absolute) 0.05 0.00 - 0.12 K/uL    Immature Granulocytes (abs) 0.10 0.00 - 0.11 K/uL    NRBC (Absolute) 0.00 K/uL   COMP METABOLIC PANEL   Result Value Ref Range    Sodium 132 (L) 135 - 145 mmol/L    Potassium 4.7 3.6 - 5.5 mmol/L    Chloride 97 96 - 112 mmol/L    Co2 15 (L) 20 - 33 mmol/L    Anion Gap 20.0 (H) 7.0 - 16.0    Glucose 387 (H) 65 - 99 mg/dL    Bun 11 8 - 22 mg/dL     Creatinine 0.44 (L) 0.50 - 1.40 mg/dL    Calcium 9.6 8.5 - 10.5 mg/dL    Correct Calcium 9.4 8.5 - 10.5 mg/dL    AST(SGOT) 43 12 - 45 U/L    ALT(SGPT) 77 (H) 2 - 50 U/L    Alkaline Phosphatase 151 (H) 30 - 99 U/L    Total Bilirubin 1.0 0.1 - 1.5 mg/dL    Albumin 4.2 3.2 - 4.9 g/dL    Total Protein 8.0 6.0 - 8.2 g/dL    Globulin 3.8 (H) 1.9 - 3.5 g/dL    A-G Ratio 1.1 g/dL   LIPASE   Result Value Ref Range    Lipase 28 11 - 82 U/L   ESTIMATED GFR   Result Value Ref Range    GFR (CKD-EPI) 137 >60 mL/min/1.73 m 2   BETA-HYDROXYBUTYRIC ACID   Result Value Ref Range    beta-Hydroxybutyric Acid 1.92 (H) 0.02 - 0.27 mmol/L   VENOUS BLOOD GAS   Result Value Ref Range    Venous Bg Ph 7.44 7.31 - 7.45    Venous Bg Ph Temp Corrected 7.43 7.31 - 7.45    Venous Bg Pco2 31.0 (L) 41.0 - 51.0 mmHg    Venous Bg Pco2 Temp Corrected 31.8 (L) 41.0 - 51.0 mmHg    Venous Bg Po2 58.0 (H) 25.0 - 40.0 mmHg    Venous Bg Po2 Temp Corrected 60.5 (H) 25.0 - 40.0 mmHg    Venous Bg O2 Saturation 89.8 %    Venous Bg Hco3 20 (L) 24 - 28 mmol/L    Venous Bg Base Excess -3 mmol/L    Body Temp 37.6 Centigrade    O2 Therapy none    EKG (NOW)   Result Value Ref Range    Report       [facility] Emergency Dept[facility]    Test Date:  2023-08-10  Pt Name:    ERICA MA            Department: ER  MRN:        [id number]                      Room:  Gender:     Male                         Technician: [id number]  :        1980                   Requested By:ER TRIAGE PROTOCOL  Order #:    [id number]                    Reading MD: MARIA ALEJANDRA CASTILLO MD    Measurements  Intervals                                Axis  Rate:       177                          P:          0  CA:         0                            QRS:        101  QRSD:       89                           T:          -24  QT:         281  QTc:        483    Interpretive Statements  , right axis deviation, no concerning ST segment abnormalities.  Inverted T waves in lead III  and aVF  I independently interpreted this EKG  Electronically Signed On 08- 18:51:20 PDT by MARIA ALEJANDRA CASTILLO MD     EKG   Result Value Ref Range    Report       [facility] Emergency Dept.    Test Date:  2023-08-10  Pt Name:    ERICA MA            Department: ER  MRN:        [id number]                      Room:        11  Gender:     Male                         Technician: [id number]  :        1980                   Requested By:MARIA ALEJANDRA CASTILLO  Order #:    [id number]                    Reading MD: MARIA ALEJANDRA CASTILLO MD    Measurements  Intervals                                Axis  Rate:       104                          P:          32  MD:         161                          QRS:        97  QRSD:       92                           T:          6  QT:         348  QTc:        458    Interpretive Statements  Sinus tachycardia  Borderline right axis deviation  Compared to ECG 08/10/2023 17:59:10  Resolved SVT  I independently interpreted this EKG  Electronically Signed On 08- 18:51:41 PDT by MARIA ALEJANDRA CASTILLO MD          RADIOLOGY  I have independently interpreted the diagnostic imaging associated with this visit and am waiting the final reading from the radiologist.   My preliminary interpretation is as follows: No infiltrate or Pneumothroax  Radiologist interpretation:   CT-ABDOMEN-PELVIS WITH   Final Result      1.  Hepatosplenomegaly with hepatic steatosis.      2.  Gastroenteritis      US-RUQ   Final Result      1.  Hepatomegaly with evidence of cirrhotic change and hepatic steatosis.      2.  Sludge noted in the gallbladder.      3.  Mild dilatation of the portal vein suspicious for portal hypertension      DX-CHEST-PORTABLE (1 VIEW)   Final Result      1.  Low lung volumes without definite acute cardiopulmonary abnormality.                  Adenosine Cardioversion Procedure Note    Indication: supraventricular tachycardia    Consent: The patient provided verbal  consent for this procedure.    Pre-Medication: none    Preparation: The patient was placed in the supine position and the chest area was exposed. The cardioversion pads were applied in the standard manner and configuration.    Attempt #1: 6 mg adenosine was administered via rapid push and resulted in no change in rhythm.    Attempt #2: 12 mg adenosine was administered via rapid push and resulted in a conversion to sinus rhythm    The patient tolerated the procedure well.    Complications: None         COURSE & MEDICAL DECISION MAKING    ED Observation Status? Yes; I am placing the patient in to an observation status due to a diagnostic uncertainty as well as therapeutic intensity. Patient placed in observation status at 6:35 PM, 8/10/2023.     Observation plan is as follows: Cardiac monitoring, blood work, possible imaging      INITIAL ASSESSMENT, COURSE AND PLAN  Care Narrative: This is a 42-year-old male presenting with right-sided abdominal pain, found to have a heart rate in the 180s and was emergently evaluated at the bedside immediately upon his arrival.  We set him up on the crash cart, adenosine was administered, 6 mg followed by 12 mg ultimately conversion to sinus rhythm.  Ongoing abdominal pain, workup will be initiated including ultrasound of the right upper quadrant, he will be reevaluated  Differential includes: Biliary obstruction, hepatitis, pancreatitis, dehydration, electrolyte abnormalities, anemia    Blood work reveals some hyperglycemia with a mild elevation in the anion gap and some evidence of acidosis, therefore a VBG was obtained revealing no acidosis, some elevation in the beta hydroxybutyric acid is noted.  He was treated with some IV fluids.  He has noted to have a significant leukocytosis nearly 16,000.  Normal lipase,normal transaminases but an elevation in his alkaline phosphatase.  For this reason ultrasound of the right upper quadrant is ordered revealing hepatomegaly and cirrhotic  changes of the liver, sludge in the gallbladder without pericholecystic fluid or gallbladder wall thickening, not consistent with cholecystitis.  There is a mild dilatation of the portal vein suspicious for portal hypertension.  The patient is reevaluated and now he is having pain in the lower portions of his abdomen bilaterally.  For that reason a CT scan is going to be obtained.    11 PM: The patient has not yet had his CT scan resulted.  I did reevaluate him, hemodynamically stable and well-appearing.  He is awaiting CT scan.  At this time will be signed out to the oncoming ER physician pending CT scan results and reevaluation and disposition.  He will remain in ED observation          CRITICAL CARE  The very real possibilty of a deterioration of this patient's condition required the highest level of my preparedness for sudden, emergent intervention.  I provided critical care services, which included medication orders, frequent reevaluations of the patient's condition and response to treatment, ordering and reviewing test results, and discussing the case with various consultants.  The critical care time associated with the care of the patient was 39 minutes. Review chart for interventions. This time is exclusive of any other billable procedures.       Working diagnosis  1. Supraventricular tachycardia (HCC)    2. Lower abdominal pain    3. Epigastric pain                       Electronically signed by: Jaydon Ying M.D., 8/10/2023 6:17 PM

## 2023-08-12 ENCOUNTER — APPOINTMENT (OUTPATIENT)
Dept: RADIOLOGY | Facility: MEDICAL CENTER | Age: 43
DRG: 389 | End: 2023-08-12
Attending: INTERNAL MEDICINE
Payer: COMMERCIAL

## 2023-08-12 ENCOUNTER — APPOINTMENT (OUTPATIENT)
Dept: RADIOLOGY | Facility: MEDICAL CENTER | Age: 43
DRG: 389 | End: 2023-08-12
Attending: STUDENT IN AN ORGANIZED HEALTH CARE EDUCATION/TRAINING PROGRAM
Payer: COMMERCIAL

## 2023-08-12 PROBLEM — I10 HYPERTENSION: Status: ACTIVE | Noted: 2023-08-12

## 2023-08-12 PROBLEM — D72.829 LEUKOCYTOSIS: Status: ACTIVE | Noted: 2023-08-12

## 2023-08-12 PROBLEM — E87.20 LACTIC ACIDOSIS: Status: ACTIVE | Noted: 2023-08-12

## 2023-08-12 PROBLEM — R65.20 SEVERE SEPSIS (HCC): Status: ACTIVE | Noted: 2023-08-12

## 2023-08-12 PROBLEM — E11.9 DIABETES MELLITUS (HCC): Status: ACTIVE | Noted: 2023-08-12

## 2023-08-12 PROBLEM — E78.5 HYPERLIPIDEMIA: Status: ACTIVE | Noted: 2023-08-12

## 2023-08-12 PROBLEM — K56.609 SMALL BOWEL OBSTRUCTION (HCC): Status: ACTIVE | Noted: 2023-08-12

## 2023-08-12 PROBLEM — A41.9 SEVERE SEPSIS (HCC): Status: ACTIVE | Noted: 2023-08-12

## 2023-08-12 LAB
ANION GAP SERPL CALC-SCNC: 20 MMOL/L (ref 7–16)
ANION GAP SERPL CALC-SCNC: 22 MMOL/L (ref 7–16)
BASOPHILS # BLD AUTO: 0.3 % (ref 0–1.8)
BASOPHILS # BLD: 0.07 K/UL (ref 0–0.12)
BUN SERPL-MCNC: 19 MG/DL (ref 8–22)
BUN SERPL-MCNC: 32 MG/DL (ref 8–22)
CALCIUM SERPL-MCNC: 9.1 MG/DL (ref 8.5–10.5)
CALCIUM SERPL-MCNC: 9.4 MG/DL (ref 8.5–10.5)
CHLORIDE SERPL-SCNC: 93 MMOL/L (ref 96–112)
CHLORIDE SERPL-SCNC: 96 MMOL/L (ref 96–112)
CO2 SERPL-SCNC: 23 MMOL/L (ref 20–33)
CO2 SERPL-SCNC: 23 MMOL/L (ref 20–33)
CREAT SERPL-MCNC: 0.51 MG/DL (ref 0.5–1.4)
CREAT SERPL-MCNC: 0.83 MG/DL (ref 0.5–1.4)
EKG IMPRESSION: NORMAL
EOSINOPHIL # BLD AUTO: 0 K/UL (ref 0–0.51)
EOSINOPHIL NFR BLD: 0 % (ref 0–6.9)
ERYTHROCYTE [DISTWIDTH] IN BLOOD BY AUTOMATED COUNT: 41.2 FL (ref 35.9–50)
EST. AVERAGE GLUCOSE BLD GHB EST-MCNC: 358 MG/DL
GFR SERPLBLD CREATININE-BSD FMLA CKD-EPI: 113 ML/MIN/1.73 M 2
GFR SERPLBLD CREATININE-BSD FMLA CKD-EPI: 131 ML/MIN/1.73 M 2
GLUCOSE BLD STRIP.AUTO-MCNC: 353 MG/DL (ref 65–99)
GLUCOSE BLD STRIP.AUTO-MCNC: 368 MG/DL (ref 65–99)
GLUCOSE BLD STRIP.AUTO-MCNC: 443 MG/DL (ref 65–99)
GLUCOSE BLD STRIP.AUTO-MCNC: 466 MG/DL (ref 65–99)
GLUCOSE BLD STRIP.AUTO-MCNC: 522 MG/DL (ref 65–99)
GLUCOSE BLD STRIP.AUTO-MCNC: 585 MG/DL (ref 65–99)
GLUCOSE SERPL-MCNC: 376 MG/DL (ref 65–99)
GLUCOSE SERPL-MCNC: 455 MG/DL (ref 65–99)
HBA1C MFR BLD: 14.1 % (ref 4–5.6)
HCT VFR BLD AUTO: 53.9 % (ref 42–52)
HGB BLD-MCNC: 18.3 G/DL (ref 14–18)
IMM GRANULOCYTES # BLD AUTO: 0.12 K/UL (ref 0–0.11)
IMM GRANULOCYTES NFR BLD AUTO: 0.5 % (ref 0–0.9)
LACTATE SERPL-SCNC: 2.1 MMOL/L (ref 0.5–2)
LACTATE SERPL-SCNC: 3.1 MMOL/L (ref 0.5–2)
LACTATE SERPL-SCNC: 4.8 MMOL/L (ref 0.5–2)
LYMPHOCYTES # BLD AUTO: 1.07 K/UL (ref 1–4.8)
LYMPHOCYTES NFR BLD: 4.7 % (ref 22–41)
MAGNESIUM SERPL-MCNC: 2.1 MG/DL (ref 1.5–2.5)
MCH RBC QN AUTO: 28.9 PG (ref 27–33)
MCHC RBC AUTO-ENTMCNC: 34 G/DL (ref 32.3–36.5)
MCV RBC AUTO: 85.2 FL (ref 81.4–97.8)
MONOCYTES # BLD AUTO: 1.7 K/UL (ref 0–0.85)
MONOCYTES NFR BLD AUTO: 7.5 % (ref 0–13.4)
NEUTROPHILS # BLD AUTO: 19.75 K/UL (ref 1.82–7.42)
NEUTROPHILS NFR BLD: 87 % (ref 44–72)
NRBC # BLD AUTO: 0 K/UL
NRBC BLD-RTO: 0 /100 WBC (ref 0–0.2)
PHOSPHATE SERPL-MCNC: 3.6 MG/DL (ref 2.5–4.5)
PLATELET # BLD AUTO: 293 K/UL (ref 164–446)
PMV BLD AUTO: 12.3 FL (ref 9–12.9)
POTASSIUM SERPL-SCNC: 3.5 MMOL/L (ref 3.6–5.5)
POTASSIUM SERPL-SCNC: 3.6 MMOL/L (ref 3.6–5.5)
PROCALCITONIN SERPL-MCNC: 0.07 NG/ML
RBC # BLD AUTO: 6.33 M/UL (ref 4.7–6.1)
SODIUM SERPL-SCNC: 136 MMOL/L (ref 135–145)
SODIUM SERPL-SCNC: 141 MMOL/L (ref 135–145)
WBC # BLD AUTO: 22.7 K/UL (ref 4.8–10.8)

## 2023-08-12 PROCEDURE — 99291 CRITICAL CARE FIRST HOUR: CPT | Performed by: INTERNAL MEDICINE

## 2023-08-12 PROCEDURE — 99231 SBSQ HOSP IP/OBS SF/LOW 25: CPT | Performed by: REGISTERED NURSE

## 2023-08-12 PROCEDURE — 99223 1ST HOSP IP/OBS HIGH 75: CPT | Performed by: STUDENT IN AN ORGANIZED HEALTH CARE EDUCATION/TRAINING PROGRAM

## 2023-08-12 PROCEDURE — 700105 HCHG RX REV CODE 258: Mod: JZ | Performed by: STUDENT IN AN ORGANIZED HEALTH CARE EDUCATION/TRAINING PROGRAM

## 2023-08-12 PROCEDURE — 82962 GLUCOSE BLOOD TEST: CPT

## 2023-08-12 PROCEDURE — 83605 ASSAY OF LACTIC ACID: CPT

## 2023-08-12 PROCEDURE — 700102 HCHG RX REV CODE 250 W/ 637 OVERRIDE(OP): Performed by: STUDENT IN AN ORGANIZED HEALTH CARE EDUCATION/TRAINING PROGRAM

## 2023-08-12 PROCEDURE — 83735 ASSAY OF MAGNESIUM: CPT

## 2023-08-12 PROCEDURE — 87040 BLOOD CULTURE FOR BACTERIA: CPT

## 2023-08-12 PROCEDURE — 700111 HCHG RX REV CODE 636 W/ 250 OVERRIDE (IP): Performed by: EMERGENCY MEDICINE

## 2023-08-12 PROCEDURE — 84100 ASSAY OF PHOSPHORUS: CPT

## 2023-08-12 PROCEDURE — 700111 HCHG RX REV CODE 636 W/ 250 OVERRIDE (IP): Performed by: INTERNAL MEDICINE

## 2023-08-12 PROCEDURE — 700105 HCHG RX REV CODE 258: Performed by: INTERNAL MEDICINE

## 2023-08-12 PROCEDURE — 700101 HCHG RX REV CODE 250: Performed by: INTERNAL MEDICINE

## 2023-08-12 PROCEDURE — 93010 ELECTROCARDIOGRAM REPORT: CPT | Performed by: INTERNAL MEDICINE

## 2023-08-12 PROCEDURE — 700111 HCHG RX REV CODE 636 W/ 250 OVERRIDE (IP)

## 2023-08-12 PROCEDURE — 93005 ELECTROCARDIOGRAM TRACING: CPT | Performed by: INTERNAL MEDICINE

## 2023-08-12 PROCEDURE — 85025 COMPLETE CBC W/AUTO DIFF WBC: CPT

## 2023-08-12 PROCEDURE — 700102 HCHG RX REV CODE 250 W/ 637 OVERRIDE(OP): Performed by: INTERNAL MEDICINE

## 2023-08-12 PROCEDURE — 80048 BASIC METABOLIC PNL TOTAL CA: CPT

## 2023-08-12 PROCEDURE — 770000 HCHG ROOM/CARE - INTERMEDIATE ICU *

## 2023-08-12 PROCEDURE — 700111 HCHG RX REV CODE 636 W/ 250 OVERRIDE (IP): Performed by: STUDENT IN AN ORGANIZED HEALTH CARE EDUCATION/TRAINING PROGRAM

## 2023-08-12 PROCEDURE — 83036 HEMOGLOBIN GLYCOSYLATED A1C: CPT

## 2023-08-12 RX ORDER — METRONIDAZOLE 500 MG/100ML
500 INJECTION, SOLUTION INTRAVENOUS EVERY 12 HOURS
Status: DISCONTINUED | OUTPATIENT
Start: 2023-08-12 | End: 2023-08-14

## 2023-08-12 RX ORDER — GLIPIZIDE 10 MG/1
10 TABLET ORAL 2 TIMES DAILY WITH MEALS
Status: ON HOLD | COMMUNITY
Start: 2023-07-27 | End: 2023-08-15

## 2023-08-12 RX ORDER — SODIUM CHLORIDE, SODIUM LACTATE, POTASSIUM CHLORIDE, AND CALCIUM CHLORIDE .6; .31; .03; .02 G/100ML; G/100ML; G/100ML; G/100ML
30 INJECTION, SOLUTION INTRAVENOUS ONCE
Status: COMPLETED | OUTPATIENT
Start: 2023-08-12 | End: 2023-08-13

## 2023-08-12 RX ORDER — SODIUM CHLORIDE AND POTASSIUM CHLORIDE 300; 900 MG/100ML; MG/100ML
INJECTION, SOLUTION INTRAVENOUS CONTINUOUS
Status: DISCONTINUED | OUTPATIENT
Start: 2023-08-12 | End: 2023-08-14

## 2023-08-12 RX ORDER — DEXTROSE MONOHYDRATE 25 G/50ML
25 INJECTION, SOLUTION INTRAVENOUS
Status: DISCONTINUED | OUTPATIENT
Start: 2023-08-12 | End: 2023-08-15 | Stop reason: HOSPADM

## 2023-08-12 RX ORDER — DEXTROSE MONOHYDRATE 25 G/50ML
25 INJECTION, SOLUTION INTRAVENOUS
Status: DISCONTINUED | OUTPATIENT
Start: 2023-08-12 | End: 2023-08-12

## 2023-08-12 RX ORDER — HYDROMORPHONE HYDROCHLORIDE 1 MG/ML
1 INJECTION, SOLUTION INTRAMUSCULAR; INTRAVENOUS; SUBCUTANEOUS
Status: DISCONTINUED | OUTPATIENT
Start: 2023-08-12 | End: 2023-08-14

## 2023-08-12 RX ORDER — SODIUM CHLORIDE, SODIUM LACTATE, POTASSIUM CHLORIDE, AND CALCIUM CHLORIDE .6; .31; .03; .02 G/100ML; G/100ML; G/100ML; G/100ML
1000 INJECTION, SOLUTION INTRAVENOUS ONCE
Status: COMPLETED | OUTPATIENT
Start: 2023-08-12 | End: 2023-08-12

## 2023-08-12 RX ORDER — ROSUVASTATIN CALCIUM 20 MG/1
20 TABLET, COATED ORAL
COMMUNITY
Start: 2023-07-27

## 2023-08-12 RX ORDER — SODIUM CHLORIDE AND POTASSIUM CHLORIDE 150; 900 MG/100ML; MG/100ML
INJECTION, SOLUTION INTRAVENOUS CONTINUOUS
Status: DISCONTINUED | OUTPATIENT
Start: 2023-08-12 | End: 2023-08-12

## 2023-08-12 RX ORDER — PROMETHAZINE HYDROCHLORIDE 25 MG/1
12.5-25 TABLET ORAL EVERY 4 HOURS PRN
Status: DISCONTINUED | OUTPATIENT
Start: 2023-08-12 | End: 2023-08-15 | Stop reason: HOSPADM

## 2023-08-12 RX ORDER — HYDROMORPHONE HYDROCHLORIDE 1 MG/ML
1 INJECTION, SOLUTION INTRAMUSCULAR; INTRAVENOUS; SUBCUTANEOUS EVERY 4 HOURS PRN
Status: DISCONTINUED | OUTPATIENT
Start: 2023-08-12 | End: 2023-08-12

## 2023-08-12 RX ORDER — ONDANSETRON 2 MG/ML
4 INJECTION INTRAMUSCULAR; INTRAVENOUS EVERY 4 HOURS PRN
Status: DISCONTINUED | OUTPATIENT
Start: 2023-08-12 | End: 2023-08-15 | Stop reason: HOSPADM

## 2023-08-12 RX ORDER — SODIUM CHLORIDE, SODIUM LACTATE, POTASSIUM CHLORIDE, CALCIUM CHLORIDE 600; 310; 30; 20 MG/100ML; MG/100ML; MG/100ML; MG/100ML
INJECTION, SOLUTION INTRAVENOUS CONTINUOUS
Status: DISCONTINUED | OUTPATIENT
Start: 2023-08-12 | End: 2023-08-12

## 2023-08-12 RX ORDER — HYDROMORPHONE HYDROCHLORIDE 1 MG/ML
0.5 INJECTION, SOLUTION INTRAMUSCULAR; INTRAVENOUS; SUBCUTANEOUS ONCE
Status: COMPLETED | OUTPATIENT
Start: 2023-08-12 | End: 2023-08-12

## 2023-08-12 RX ORDER — PROCHLORPERAZINE EDISYLATE 5 MG/ML
5-10 INJECTION INTRAMUSCULAR; INTRAVENOUS EVERY 4 HOURS PRN
Status: DISCONTINUED | OUTPATIENT
Start: 2023-08-12 | End: 2023-08-15 | Stop reason: HOSPADM

## 2023-08-12 RX ORDER — AMOXICILLIN 500 MG/1
500 CAPSULE ORAL 3 TIMES DAILY
Status: ON HOLD | COMMUNITY
Start: 2023-07-27 | End: 2023-08-14

## 2023-08-12 RX ORDER — ONDANSETRON 4 MG/1
4 TABLET, ORALLY DISINTEGRATING ORAL EVERY 4 HOURS PRN
Status: DISCONTINUED | OUTPATIENT
Start: 2023-08-12 | End: 2023-08-15 | Stop reason: HOSPADM

## 2023-08-12 RX ORDER — HYDRALAZINE HYDROCHLORIDE 20 MG/ML
10 INJECTION INTRAMUSCULAR; INTRAVENOUS EVERY 4 HOURS PRN
Status: DISCONTINUED | OUTPATIENT
Start: 2023-08-12 | End: 2023-08-15 | Stop reason: HOSPADM

## 2023-08-12 RX ORDER — LOVASTATIN 20 MG/1
20 TABLET ORAL DAILY
Status: ON HOLD | COMMUNITY
Start: 2023-07-02 | End: 2023-08-15

## 2023-08-12 RX ORDER — SODIUM CHLORIDE, SODIUM LACTATE, POTASSIUM CHLORIDE, AND CALCIUM CHLORIDE .6; .31; .03; .02 G/100ML; G/100ML; G/100ML; G/100ML
500 INJECTION, SOLUTION INTRAVENOUS
Status: DISCONTINUED | OUTPATIENT
Start: 2023-08-12 | End: 2023-08-15 | Stop reason: HOSPADM

## 2023-08-12 RX ORDER — PROMETHAZINE HYDROCHLORIDE 25 MG/1
12.5-25 SUPPOSITORY RECTAL EVERY 4 HOURS PRN
Status: DISCONTINUED | OUTPATIENT
Start: 2023-08-12 | End: 2023-08-15 | Stop reason: HOSPADM

## 2023-08-12 RX ADMIN — INSULIN GLARGINE-YFGN 10 UNITS: 100 INJECTION, SOLUTION SUBCUTANEOUS at 13:55

## 2023-08-12 RX ADMIN — ONDANSETRON 4 MG: 2 INJECTION INTRAMUSCULAR; INTRAVENOUS at 00:15

## 2023-08-12 RX ADMIN — HYDROMORPHONE HYDROCHLORIDE 1 MG: 1 INJECTION, SOLUTION INTRAMUSCULAR; INTRAVENOUS; SUBCUTANEOUS at 12:01

## 2023-08-12 RX ADMIN — SODIUM CHLORIDE, POTASSIUM CHLORIDE, SODIUM LACTATE AND CALCIUM CHLORIDE 1845 ML: 600; 310; 30; 20 INJECTION, SOLUTION INTRAVENOUS at 13:53

## 2023-08-12 RX ADMIN — HYDROMORPHONE HYDROCHLORIDE 1 MG: 1 INJECTION, SOLUTION INTRAMUSCULAR; INTRAVENOUS; SUBCUTANEOUS at 20:05

## 2023-08-12 RX ADMIN — CEFTRIAXONE SODIUM 2000 MG: 10 INJECTION, POWDER, FOR SOLUTION INTRAVENOUS at 13:58

## 2023-08-12 RX ADMIN — INSULIN HUMAN 14 UNITS: 100 INJECTION, SOLUTION PARENTERAL at 09:30

## 2023-08-12 RX ADMIN — HYDROMORPHONE HYDROCHLORIDE 1 MG: 1 INJECTION, SOLUTION INTRAMUSCULAR; INTRAVENOUS; SUBCUTANEOUS at 07:58

## 2023-08-12 RX ADMIN — METRONIDAZOLE 500 MG: 5 INJECTION, SOLUTION INTRAVENOUS at 14:14

## 2023-08-12 RX ADMIN — THIAMINE HYDROCHLORIDE 100 MG: 100 INJECTION, SOLUTION INTRAMUSCULAR; INTRAVENOUS at 20:28

## 2023-08-12 RX ADMIN — INSULIN HUMAN 12 UNITS: 100 INJECTION, SOLUTION PARENTERAL at 17:40

## 2023-08-12 RX ADMIN — SODIUM CHLORIDE, POTASSIUM CHLORIDE, SODIUM LACTATE AND CALCIUM CHLORIDE: 600; 310; 30; 20 INJECTION, SOLUTION INTRAVENOUS at 01:36

## 2023-08-12 RX ADMIN — HYDROMORPHONE HYDROCHLORIDE 0.5 MG: 1 INJECTION, SOLUTION INTRAMUSCULAR; INTRAVENOUS; SUBCUTANEOUS at 00:15

## 2023-08-12 RX ADMIN — HYDROMORPHONE HYDROCHLORIDE 1 MG: 1 INJECTION, SOLUTION INTRAMUSCULAR; INTRAVENOUS; SUBCUTANEOUS at 03:34

## 2023-08-12 RX ADMIN — SODIUM CHLORIDE, POTASSIUM CHLORIDE, SODIUM LACTATE AND CALCIUM CHLORIDE 1000 ML: 600; 310; 30; 20 INJECTION, SOLUTION INTRAVENOUS at 20:24

## 2023-08-12 RX ADMIN — POTASSIUM CHLORIDE AND SODIUM CHLORIDE: 900; 150 INJECTION, SOLUTION INTRAVENOUS at 09:22

## 2023-08-12 RX ADMIN — HYDROMORPHONE HYDROCHLORIDE 0.5 MG: 1 INJECTION, SOLUTION INTRAMUSCULAR; INTRAVENOUS; SUBCUTANEOUS at 05:38

## 2023-08-12 RX ADMIN — INSULIN HUMAN 14 UNITS: 100 INJECTION, SOLUTION PARENTERAL at 12:13

## 2023-08-12 RX ADMIN — INSULIN GLARGINE-YFGN 15 UNITS: 100 INJECTION, SOLUTION SUBCUTANEOUS at 21:44

## 2023-08-12 RX ADMIN — POTASSIUM CHLORIDE AND SODIUM CHLORIDE: 900; 300 INJECTION, SOLUTION INTRAVENOUS at 23:01

## 2023-08-12 RX ADMIN — INSULIN HUMAN 6 UNITS: 100 INJECTION, SOLUTION PARENTERAL at 05:48

## 2023-08-12 RX ADMIN — POTASSIUM CHLORIDE AND SODIUM CHLORIDE: 900; 300 INJECTION, SOLUTION INTRAVENOUS at 14:13

## 2023-08-12 RX ADMIN — INSULIN GLARGINE-YFGN 10 UNITS: 100 INJECTION, SOLUTION SUBCUTANEOUS at 05:47

## 2023-08-12 RX ADMIN — INSULIN HUMAN 5 UNITS: 100 INJECTION, SOLUTION PARENTERAL at 01:57

## 2023-08-12 ASSESSMENT — COGNITIVE AND FUNCTIONAL STATUS - GENERAL
MOBILITY SCORE: 14
PERSONAL GROOMING: A LITTLE
DAILY ACTIVITIY SCORE: 19
HELP NEEDED FOR BATHING: A LITTLE
SUGGESTED CMS G CODE MODIFIER DAILY ACTIVITY: CK
DRESSING REGULAR UPPER BODY CLOTHING: A LITTLE
MOVING FROM LYING ON BACK TO SITTING ON SIDE OF FLAT BED: A LITTLE
STANDING UP FROM CHAIR USING ARMS: A LOT
SUGGESTED CMS G CODE MODIFIER MOBILITY: CL
MOVING TO AND FROM BED TO CHAIR: A LOT
TOILETING: A LITTLE
DRESSING REGULAR LOWER BODY CLOTHING: A LITTLE
WALKING IN HOSPITAL ROOM: A LOT
CLIMB 3 TO 5 STEPS WITH RAILING: A LOT
TURNING FROM BACK TO SIDE WHILE IN FLAT BAD: A LITTLE

## 2023-08-12 ASSESSMENT — LIFESTYLE VARIABLES
TOTAL SCORE: 0
DOES PATIENT WANT TO STOP DRINKING: NO
EVER FELT BAD OR GUILTY ABOUT YOUR DRINKING: NO
TOTAL SCORE: 0
TOTAL SCORE: 0
EVER HAD A DRINK FIRST THING IN THE MORNING TO STEADY YOUR NERVES TO GET RID OF A HANGOVER: NO
AVERAGE NUMBER OF DAYS PER WEEK YOU HAVE A DRINK CONTAINING ALCOHOL: 0
HAVE YOU EVER FELT YOU SHOULD CUT DOWN ON YOUR DRINKING: NO
ALCOHOL_USE: NO
HAVE PEOPLE ANNOYED YOU BY CRITICIZING YOUR DRINKING: NO
HOW MANY TIMES IN THE PAST YEAR HAVE YOU HAD 5 OR MORE DRINKS IN A DAY: 0
ON A TYPICAL DAY WHEN YOU DRINK ALCOHOL HOW MANY DRINKS DO YOU HAVE: 0
CONSUMPTION TOTAL: NEGATIVE

## 2023-08-12 ASSESSMENT — PAIN DESCRIPTION - PAIN TYPE
TYPE: ACUTE PAIN
TYPE: ACUTE PAIN

## 2023-08-12 ASSESSMENT — PATIENT HEALTH QUESTIONNAIRE - PHQ9
SUM OF ALL RESPONSES TO PHQ9 QUESTIONS 1 AND 2: 0
2. FEELING DOWN, DEPRESSED, IRRITABLE, OR HOPELESS: NOT AT ALL
1. LITTLE INTEREST OR PLEASURE IN DOING THINGS: NOT AT ALL

## 2023-08-12 ASSESSMENT — FIBROSIS 4 INDEX: FIB4 SCORE: 0.36

## 2023-08-12 NOTE — CONSULTS
DATE OF CONSULTATION:  8/11/2023     REFERRING PHYSICIAN:   Adan Rodríguez M.D.     CONSULTING PHYSICIAN:  Prabhu Gonzalez M.D.     REASON FOR CONSULTATION:  I have been asked by  to see the patient in surgical consultation for evaluation of a partial mechanical small bowel obstruction.    HISTORY OF PRESENT ILLNESS: The patient is a 42 year-old man who presents to the Emergency Department with a several day history of generalized and persistent abdominal pain. The pain is associated with anorexia and malaise. He The patient denies any recent or intercurrent illness. The patient has undergone a distant laparotomy and diverting colostomy with subsequent reversal.  He denies any previous symptoms of small bowel obstruction and has not undergone previous abdominal surgery for bowel obstruction.     PAST MEDICAL HISTORY:  has a past medical history of Diabetes (HCC), Hyperlipemia, and Hypertension.    PAST SURGICAL HISTORY:  has no past surgical history on file.    ALLERGIES: No Known Allergies    CURRENT MEDICATIONS:    Home Medications    **Home medications have not yet been reviewed for this encounter**       FAMILY HISTORY: family history is not on file.    SOCIAL HISTORY:  reports that he has never smoked. He has never used smokeless tobacco. He reports current alcohol use. He reports that he does not use drugs.    REVIEW OF SYSTEMS: Comprehensive review of systems is negative with the exception of the aforementioned HPI, PMH, and PSH bullets in accordance with CMS guidelines.    PHYSICAL EXAMINATION:      Constitutional:     Vital Signs: BP (!) 144/85   Pulse 97   Temp 36.7 °C (98.1 °F) (Temporal)   Resp 19   Wt 114 kg (250 lb 14.1 oz)   SpO2 94%    General Appearance: alert in no acute distress.   HEENT:    Demonstrates symmetric, reactive pupils. Extraocular muscles   are intact. Nares and oropharynx are clear.   Neck:    Supple. No adenopathy.  Respiratory:   Inspection: Unlabored  respirations, no intercostal retractions, paradoxical motion, or accessory muscle use.   Auscultation: clear to auscultation.  Cardiovascular:   Inspection: The skin is warm and dry.  Auscultation: Sinus tachycardia.   Peripheral Pulses: Normal.   Abdomen:  Inspection: Abdominal inspection reveals mild abdominal distension.  He has well-healed, somewhat hypertrophic midline and left lower quadrant scars.  No significant hernias are evident.  Nasogastric tube with modest dark output.   Palpation: Palpation is remarkable for mild generalized tenderness and guarding.  Extremities:   Examination of the upper and lower extremities demonstrates no cyanosis edema or clubbing.  Neurologic:   Alert & oriented to person, time and place. Normal motor function. Normal sensory function. No focal deficits noted.  Psychiatric:   He does not appear depressed or anxious, oriented to time, place, person, short and long term memory appears intact, judgement and insight appear intact.    LABORATORY VALUES:   Recent Labs     08/10/23  1817 08/11/23  1951   WBC 15.9* 22.9*   RBC 6.09 6.25*   HEMOGLOBIN 17.2 17.7   HEMATOCRIT 51.7 52.4*   MCV 84.9 83.8   MCH 28.2 28.3   MCHC 33.3 33.8   RDW 39.8 39.6   PLATELETCT 267 335   MPV 12.4 12.1     Recent Labs     08/10/23  1817 08/11/23  1951   SODIUM 132* 134*   POTASSIUM 4.7 3.8   CHLORIDE 97 92*   CO2 15* 20   GLUCOSE 387* 369*   BUN 11 15   CREATININE 0.44* 0.47*   CALCIUM 9.6 9.7     Recent Labs     08/10/23  1817 08/11/23  1951   ASTSGOT 43 22   ALTSGPT 77* 52*   TBILIRUBIN 1.0 1.4   ALKPHOSPHAT 151* 152*   GLOBULIN 3.8* 3.6*     IMAGING:   CT-ABDOMEN-PELVIS WITH   Final Result      1.  Acute distal small bowel obstruction.      2.  Hepatosplenomegaly with hepatic steatosis      CT-CTA CHEST PULMONARY ARTERY W/ RECONS   Final Result      1.  No CT evidence of pulmonary emboli.                ASSESSMENT AND PLAN:   The patient has clinical and radiographic findings of partial small bowel  obstruction without generalized peritonitis, evidence of clinical deterioration, or radiographic findings consistent with bowel ischemia. Nonoperative management, nasogastric decompression, serial abdominal examination, and water-soluble contrast imaging within 48 hours if obstructive symptoms fail to resolve.     DISPOSITION: Medical evaluation and admission. The patient was admitted to the Medical Service prior to surgical consultation. [facility] [place] [facility] will follow.       ____________________________________     Prabhu Gonzalez M.D.    DD: 8/11/2023  11:57 PM

## 2023-08-12 NOTE — PROGRESS NOTES
Higher Level of Care. Report received from Veronica Guillaume RN. Updated on POC.  Assumed care of patient upon arrival to unit. Patient currently A & O x 4; on 3 L O2 silicone nasal cannula; up with complaints of acute abdominal pain. Patient pulled NG tube out, replaced by charge RN and connected to suction. IV ABX and fluids initiated. Dr. Delgado notified regarding patient's vitals, and labs. Dr. Delgado shortly arrived to room. STAT EKG  and Xray ordered. Pt placed on monitor, monitor room notified, . Patient oriented to unit and to call light system. Call light within reach. Pt educated to fall risk. Fall precautions in place. Pt provided with personal grooming items. Bed locked and in lowest position. All questions answered. No other needs indicated at this time.

## 2023-08-12 NOTE — ASSESSMENT & PLAN NOTE
Present on admission  Source GI  Empiric antibiotics to cover abdominal source  Following cultures  CT scan was negative for drainable abscess, or other acute surgical history

## 2023-08-12 NOTE — ED NOTES
Med Rec complete per patient/home pharmacy  No known allergies  Preferred [facility]: [facility]  Patient unsure if taking Rosuvastatin or Lovastatin  Patient states he did not finish his course of Amoxicillin, unsure how many days he took for

## 2023-08-12 NOTE — PROGRESS NOTES
4 Eyes Skin Assessment Completed by YUDITH Hill and YUDITH Conte.    Head WDL  Ears Redness and Blanching  Nose WDL  Mouth WDL  Neck WDL  Breast/Chest WDL  Shoulder Blades WDL  Spine WDL  (R) Arm/Elbow/Hand WDL  (L) Arm/Elbow/Hand WDL  Abdomen Distended and old scar  Groin Excoriation  Scrotum/Coccyx/Buttocks Redness, Non-Blanching, and Excoriation  (R) Leg Bruising  (L) Leg Bruising and varicose veins  (R) Heel/Foot/Toe Redness and Blanching  (L) Heel/Foot/Toe Redness and Blanching  Right armpit small growths     Devices In Places Tele Box, Blood Pressure Cuff, Pulse Ox, OG/NG, and Nasal Cannula    Interventions In Place Gray Ear Foams, NC W/Ear Foams, Pillows, Barrier Cream, and Heels Loaded W/Pillows    Possible Skin Injury Yes    Pictures Uploaded Into Epic Yes  Wound Consult Placed Yes  RN Wound Prevention Protocol Ordered Yes

## 2023-08-12 NOTE — PROGRESS NOTES
Was in the patients room to check his blood sugar-which turned out to be 585 and the patient looked very flushed, put the back of my fingers against his forehead and he felt hot, had the CNA get a temp on him and it was 102.2. I texted the doctor that his blood glucose was elevated to 585 and that he now had a temp, MD put him on the Sepsis pathway, ordered antibiotics, labs, blood cultures and a transfer to higher level of care. Report was called to YUDITH Hill.

## 2023-08-12 NOTE — ED NOTES
Chief Complaint   Patient presents with    Abdominal Pain     Pt c/o generalized abdominal pain that began yesterday, s/s heart burn, subjective fever.      Pt was going to be brought to room 44, but was moved to room 57 when it was noted that the pt was pale and diaphoretic. Pt is conscious and oriented, but needs to be roused for questions. Pt has a patent airway and no signs of resp. Distress. Pt changed into gown, placed in bed and placed on monitoring. 18 gauge IV placed in left forearm. Pt complains of abd pain, but is not specific on location. States that he just hurts all over.  Notified and chart placed up for treatment.

## 2023-08-12 NOTE — ASSESSMENT & PLAN NOTE
On metformin and glipizide at home  A1c 14.1%  Started on lantus 15 units which I have increased to 20 units BID today  Diabetic education consult ordered

## 2023-08-12 NOTE — PROGRESS NOTES
DATE: 8/12/2023    Hospital Day 2  Small bowel obstruction .    INTERVAL EVENTS:  NG to suction with gastric output.  Pt reports pain improving with gastric decompression.    PHYSICAL EXAMINATION:  Vital Signs: BP (!) 146/91   Pulse (!) 123   Temp 36.8 °C (98.3 °F) (Temporal)   Resp (!) 28   Wt 113 kg (250 lb)   SpO2 91%     Abdominal distended, semi-firm, mild tenderness.    LABORATORY VALUES:  Recent Labs     08/10/23  1817 08/11/23  1949 08/12/23  0423   WBC 15.9* 22.9* 22.7*   RBC 6.09 6.25* 6.33*   HEMOGLOBIN 17.2 17.7 18.3*   HEMATOCRIT 51.7 52.4* 53.9*   MCV 84.9 83.8 85.2   MCH 28.2 28.3 28.9   MCHC 33.3 33.8 34.0   RDW 39.8 39.6 41.2   PLATELETCT 267 335 293   MPV 12.4 12.1 12.3     Recent Labs     08/10/23  1817 08/11/23  1949 08/12/23  0423   SODIUM 132* 134* 136   POTASSIUM 4.7 3.8 3.6   CHLORIDE 97 92* 93*   CO2 15* 20 23   GLUCOSE 387* 369* 376*   BUN 11 15 19   CREATININE 0.44* 0.47* 0.51   CALCIUM 9.6 9.7 9.4     Recent Labs     08/10/23  1817 08/11/23  1951   ASTSGOT 43 22   ALTSGPT 77* 52*   TBILIRUBIN 1.0 1.4   ALKPHOSPHAT 151* 152*   GLOBULIN 3.8* 3.6*            IMAGING:  DX-ABDOMEN FOR TUBE PLACEMENT   Final Result      1.  NG tube extends into the fundus the stomach.      CT-ABDOMEN-PELVIS WITH   Final Result      1.  Acute distal small bowel obstruction.      2.  Hepatosplenomegaly with hepatic steatosis      CT-CTA CHEST PULMONARY ARTERY W/ RECONS   Final Result      1.  No CT evidence of pulmonary emboli.                ASSESSMENT AND PLAN:  * Small bowel obstruction (HCC)- (present on admission)  Assessment & Plan  NG tube to suction.  Will plan for SBFT on 8/13.           ____________________________________     BROOKE Warner    DD: 8/12/2023  11:11 AM

## 2023-08-12 NOTE — CARE PLAN
The patient is Stable - Low risk of patient condition declining or worsening    Shift Goals  Clinical Goals:  (BM, pain management)  Patient Goals:  (pain management)    Progress made toward(s) clinical / shift goals:    Problem: Skin Integrity  Goal: Skin integrity is maintained or improved  Outcome: Progressing  Note: Patient is able to turn himself, stand and pivot to chair       Patient is not progressing towards the following goals:

## 2023-08-12 NOTE — PROGRESS NOTES
4 Eyes Skin Assessment Completed by Margie RN and Ne RN.    Head WDL  Ears WDL  Nose WDL  Mouth WDL  Neck WDL  Breast/Chest WDL  Shoulder Blades WDL  Spine WDL  (R) Arm/Elbow/Hand WDL  (L) Arm/Elbow/Hand WDL  Abdomen WDL  Groin WDL  Scrotum/Coccyx/Buttocks WDL  (R) Leg Blanching  (L) Leg Swelling  (R) Heel/Foot/Toe Blanching  (L) Heel/Foot/Toe Blanching          Devices In Places Blood Pressure Cuff      Interventions In Place Gray Ear Foams    Possible Skin Injury No    Pictures Uploaded Into Epic N/A  Wound Consult Placed N/A  RN Wound Prevention Protocol Ordered No

## 2023-08-12 NOTE — ED PROVIDER NOTES
ED Provider Note    Primary care provider: James Raymond M.D.    CHIEF COMPLAINT  Chief Complaint   Patient presents with    Abdominal Pain     Pt c/o generalized abdominal pain that began yesterday, s/s heart burn, subjective fever.          Limitation to History:  Select: Language Kyrgyz,  Used     HPI  Bautista Mcdermott is a 42 y.o. male who presents to the Emergency Department several days of diffuse abdominal pain, decreased appetite, generalized malaise.  The patient has not eaten anything for several days.  He has not had any bowel movements.  He denies any vomiting, diarrhea.  Denies any chest pain or shortness of breath.  History of hypertension.    When he was 12 years old he underwent exploratory laparotomy for a rupture in his colon, he had a diverting ostomy and was eventually reconnected.  He does not know what exactly was the initial cause.  He has not had any abdominal surgeries or problems with abdominal pain since this time.      External Record Review: Patient with history of NIDDM was evaluated yesterday for abdominal pain, found to be in SVT, received adenosine, also found to have mild acidosis with a bicarb of 15, VBG was obtained that did not show any severe acidosis.  He was treated with IV fluids, apparently improved.  He did have some diffuse abdominal pain and underwent CT and ultrasound that showed biliary sludge, no evidence of acute cholecystitis.  There were dilated loops of small bowel and he was diagnosed with gastroenteritis based on the CT.    REVIEW OF SYSTEMS  See HPI.     PAST MEDICAL HISTORY   has a past medical history of Diabetes (HCC), Hyperlipemia, and Hypertension.    SURGICAL HISTORY  Laparotomy 12 years old    SOCIAL HISTORY  Social History     Tobacco Use    Smoking status: Never    Smokeless tobacco: Never   Vaping Use    Vaping Use: Never used   Substance Use Topics    Alcohol use: Yes     Comment: rarely    Drug use: No      Social History      Substance and Sexual Activity   Drug Use No       FAMILY HISTORY  History reviewed. No pertinent family history.    CURRENT MEDICATIONS  Reviewed.  See Encounter Summary.     ALLERGIES  No Known Allergies    PHYSICAL EXAM  VITAL SIGNS: BP (!) 144/85   Pulse 97   Temp 36.7 °C (98.1 °F) (Temporal)   Resp 19   Wt 114 kg (250 lb 14.1 oz)   SpO2 94%   BMI 41.75 kg/m²   Constitutional: ill-appearing, lethargic, hypoxic, oxygenation 86% on room air.  HENT: Normocephalic, Bilateral external ears normal. Nose normal. dry mucosal membranes  Eyes: Conjunctiva normal, non-icteric, EOMI.   Thorax & Lungs: Easy unlabored respirations, Clear to ascultation bilaterally.  Cardiovascular: Regular rate, Regular rhythm, No murmurs, rubs or gallops. Bilateral pulses symmetrical.   Abdomen:  Soft, diffuse abdominal tenderness, prior ex lap scar, nondistended, hypoactive bowel sounds.  :    Skin: Visualized skin is  Dry, No erythema, No rash.   Musculoskeletal:   No cyanosis, clubbing or edema. No leg asymmetry.   Neurologic: Alert, Grossly non-focal.   Psychiatric: Normal affect, Normal mood  Lymphatic:          RADIOLOGY  I have independently interpreted the diagnostic imaging associated with this visit and am waiting the final reading from the radiologist.   My preliminary interpretation is as follows: CT chest: No pulmonary embolus, no pleural effusions    Radiologist interpretation:   CT-ABDOMEN-PELVIS WITH   Final Result      1.  Acute distal small bowel obstruction.      2.  Hepatosplenomegaly with hepatic steatosis      CT-CTA CHEST PULMONARY ARTERY W/ RECONS   Final Result      1.  No CT evidence of pulmonary emboli.                COURSE & MEDICAL DECISION MAKING  Pertinent Labs & Imaging studies reviewed. (See chart for details)    COURSE & MEDICAL DECISION MAKING  Pertinent Labs & Imaging studies reviewed. (See chart for details)    Differential diagnoses include but are not limited to: Small bowel obstruction,  sepsis, DKA, pulmonary embolism    9:35 PM - Nursing notes reviewed, patient seen and examined at bedside.    11:58 PM: Patient reassessed, he is still having significant pain, NG tube, Dilaudid will be administered at this time.  Case discussed with Dr. Gonzalez, general surgery, the surgical team will follow along with internal medicine.  Case discussed with the hospitalist will evaluate the patient for admission.    Discussion of management with other medical personnel: Hospitalist, general surgery    Decision Making:  This is a pleasant 42 y.o. year old male who presents with diffuse severe abdominal pain for at least 24 hours.  The patient was seen here yesterday for SVT, also had abdominal pain and underwent ultrasound and CT yesterday.  The CT was read as gastroenteritis, though he did have multiple air-fluid levels in small bowel dilatation, likely this was a developing SBO yesterday which has intensified throughout the day today.  SBO study done today does confirm the bowel obstruction.  NG tube to be placed here in the ER, patient was fluid resuscitated.  He has a very high white count of 23 with neutrophil predominance, though I do not see any source of infection, presumably this is due to a stress response.    Fortunately his metabolic acidosis from yesterday did improve somewhat with a CO2 of 20.    Patient will be hospitalized in guarded condition.    CRITICAL CARE  The very real possibilty of a deterioration of this patient's condition required the highest level of my preparedness for sudden, emergent intervention.  I provided critical care services, which included medication orders, frequent reevaluations of the patient's condition and response to treatment, ordering and reviewing test results, and discussing the case with various consultants.  The critical care time associated with the care of the patient was 30 minutes. Review chart for interventions. This time is exclusive of any other billable  procedures.         FINAL IMPRESSION  1. Small bowel obstruction (HCC)

## 2023-08-12 NOTE — ASSESSMENT & PLAN NOTE
Previous surgical history significant for ruptured appendicitis, and bowel perforation which he suffered in his early teens.  Patient had a colostomy for period of about 6 months which was then reversed.  CT A/P 8/11 w/ Acute Distal SBO  Conservative management with bowel rest, IVFs, NGT to suction  Surgery following, no surgical indication  8/14 NGT out, CLD ADAT. DC mIVF tomorrow

## 2023-08-12 NOTE — ASSESSMENT & PLAN NOTE
NG tube to suction.  8/13 SBFT cnx due to multiple BM. Ng tube clamped. Clear liquids  8/14 NG removed. Advance diet.

## 2023-08-12 NOTE — H&P
Hospital Medicine History & Physical Note    Date of Service  8/12/2023    Primary Care Physician  James Raymond M.D.    Consultants  general surgery    Specialist Names: Dr. Gonzalez     Code Status  Full Code    Chief Complaint  Chief Complaint   Patient presents with    Abdominal Pain     Pt c/o generalized abdominal pain that began yesterday, s/s heart burn, subjective fever.        History of Presenting Illness  Bautista Mcdermott is a 42 y.o. male past medical history of hypertension, diabetes mellitus, hyperlipidemia who presented 8/11/2023 with abdominal pain that started yesterday.  Patient reports she was seen in the ER yesterday for SVT and abdominal pain at that time CT done showed gastroenteritis with air-fluid bubbles he was given IV fluids and discharged home. History is further limited as patient received IV narcotics prior to my arrival.  In the ER, NG tube was inserted and general surgery was consulted. CT abdomen pelvis done today showing acute distal small bowel obstruction. General surgery was consulted recommended Medicine admission, NGT  placement and conservative management at this time. Admitted to medicine service.     I discussed the plan of care with to.    Review of Systems  ROS    Past Medical History   has a past medical history of Diabetes (HCC), Hyperlipemia, and Hypertension.    Surgical History   has no past surgical history on file.     Family History  family history is not on file.   Family history reviewed with patient. There is no family history that is pertinent to the chief complaint.     Social History   reports that he has never smoked. He has never used smokeless tobacco. He reports current alcohol use. He reports that he does not use drugs.    Allergies  No Known Allergies    Medications  Prior to Admission Medications   Prescriptions Last Dose Informant Patient Reported? Taking?   amoxicillin (AMOXIL) 500 MG Cap UNK at UNK Patient Yes No   Sig: Take 500 mg by mouth 3  times a day. 10 day course started 7/27/23   glipiZIDE (GLUCOTROL) 10 MG Tab FEW DAYS AGO at [facility] Patient Yes No   Sig: Take 10 mg by mouth 2 times a day with meals.   lisinopril (PRINIVIL) 10 MG Tab FEW DAYS AGO at [facility] Patient Yes No   Sig: Take 10 mg by mouth every day.   lovastatin (MEVACOR) 20 MG Tab UNK at [facility] Patient Yes No   Sig: Take 20 mg by mouth every day.   metformin (GLUCOPHAGE) 1000 MG tablet FEW DAYS AGO at [facility] Patient Yes No   Sig: Take 1,000 mg by mouth 2 times a day.   omeprazole (PRILOSEC) 20 MG delayed-release capsule NEW RX at NOT STARTED Patient No No   Sig: Take 1 Capsule by mouth every day for 90 days.   ondansetron (ZOFRAN ODT) 4 MG TABLET DISPERSIBLE NEW RX at NOT STARTED Patient No No   Sig: Take 1 Tablet by mouth every 6 hours as needed for Nausea/Vomiting.   rosuvastatin (CRESTOR) 20 MG Tab K Patient Yes No   Sig: Take 20 mg by mouth every day.      Facility-Administered Medications: None       Physical Exam  Temp:  [36.3 °C (97.3 °F)-36.7 °C (98.1 °F)] 36.3 °C (97.3 °F)  Pulse:  [] 105  Resp:  [18-20] 19  BP: (133-161)/(74-94) 152/94  SpO2:  [88 %-97 %] 90 %  Blood Pressure: (!) 152/94   Temperature: 36.3 °C (97.3 °F)   Pulse: (!) 105   Respiration: 19   Pulse Oximetry: 90 %       Physical Exam    Laboratory:  Recent Labs     08/10/23  1817 08/11/23  1951   WBC 15.9* 22.9*   RBC 6.09 6.25*   HEMOGLOBIN 17.2 17.7   HEMATOCRIT 51.7 52.4*   MCV 84.9 83.8   MCH 28.2 28.3   MCHC 33.3 33.8   RDW 39.8 39.6   PLATELETCT 267 335   MPV 12.4 12.1     Recent Labs     08/10/23  1817 08/11/23  1951   SODIUM 132* 134*   POTASSIUM 4.7 3.8   CHLORIDE 97 92*   CO2 15* 20   GLUCOSE 387* 369*   BUN 11 15   CREATININE 0.44* 0.47*   CALCIUM 9.6 9.7     Recent Labs     08/10/23  1817 08/11/23 1951   ALTSGPT 77* 52*   ASTSGOT 43 22   ALKPHOSPHAT 151* 152*   TBILIRUBIN 1.0 1.4   LIPASE 28 25   GLUCOSE 387* 369*         No results for input(s): NTPROBNP in the last 72 hours.      No results for input(s):  TROPONINT in the last 72 hours.    Imaging:  DX-ABDOMEN FOR TUBE PLACEMENT   Final Result      1.  NG tube extends into the fundus the stomach.      CT-ABDOMEN-PELVIS WITH   Final Result      1.  Acute distal small bowel obstruction.      2.  Hepatosplenomegaly with hepatic steatosis      CT-CTA CHEST PULMONARY ARTERY W/ RECONS   Final Result      1.  No CT evidence of pulmonary emboli.                X-Ray:  I have personally reviewed the images and compared with prior images.    Assessment/Plan:  Justification for Admission Status  I anticipate this patient will require at least two midnights for appropriate medical management, necessitating inpatient admission because small bowel obstruction requiring conservative measures, decompression with NGT and general surgery  consultation.    Patient will need a Med/Surg bed on SURGICAL service .  The need is secondary to see above.    * Small bowel obstruction (HCC)- (present on admission)  Assessment & Plan  CT A/P Showing Acute Distal SBO  Serial abdominal exams  NPO   NG tube inserted continuous intermittent suction   IV Fluids  Pain control  General surgery consulted - appreciate recommendations     Leukocytosis  Assessment & Plan  Likely reactive   PCT neg hold abx  Serial CBC    Lactic acidosis  Assessment & Plan  Likely secondary to SBO  Trend  IV Fluids  F/u Gen Surg recommendations       Diabetes mellitus (HCC)  Assessment & Plan  On metformin at home  Continue with SSI+Accu checks + hypoglycemia protocol     Hypertension  Assessment & Plan  Hold po medications at this time as patient has NGT to suction.    Hyperlipidemia  Assessment & Plan  Hold po medications at this time as patient has NGT to suction.  IV as needed      Please note that this dictation was created using voice recognition software. I have made every reasonable attempt to correct obvious errors, but I expect that there are errors of grammar and possibly context that I did not discover before  finalizing the note.      VTE prophylaxis: SCDs/TEDs

## 2023-08-12 NOTE — ED TRIAGE NOTES
Chief Complaint   Patient presents with    Abdominal Pain     Pt c/o generalized abdominal pain that began yesterday, s/s heart burn, subjective fever.      Pt ambulatory to triage with family for above complaint. Pt had been in to ER yesterday for same complaint and sent home. Pt states the pain has gotten worse and he feels warm, weak, and a burning sensation in his stomach.    Pt is alert/oriented and follows commands. Pt speaking in full sentences and responds appropriately to questions. No acute distress noted in triage and respirations are even and unlabored.     Pt placed in lobby and educated on triage process. Pt encouraged to alert staff for any changes in condition.

## 2023-08-12 NOTE — PROGRESS NOTES
Patient's spouse brought in MMR titer and Quanitferon results from [employer] [facility].  Patient is immune to Rubeola and Mumps.  Does not have immunity to Rubella.  Quantiferon negative.  Per verbal order from Dr. Menendez for MMR X1 dose.    No call back from Sleep Lab today.    Phone call to patient.  Instructed he needs to come in for MMR at nurse appointment.  He will come in on Friday.      Discussed Sleep Study and message left for sleep lab.  He does not want to have to pay for another sleep study.  Advised him will obtain more information and discuss with him on Friday when he comes in for MMR.         Significant event note:    42-year-old male with a history of hypertension, diabetes, prior abdominal surgeries (ex lap diverting colostomy and subsequent reversal) admitted with abdominal pain and found to have SBO.  In the ER yesterday he was found to have SVT and underwent adenosine.  Surgery was consulted for SBO and patient had NG placed.    Patient seen and examined this morning.  Patient febrile, tachycardic to the 140s, hypertensive to the 140s, and hypoxic requiring 3 L nasal cannula.  Patient continues to have abdominal pain and is asking for water.  Patient is passing gas and has had some small bowel movements.    Labs markable for WBC 22, anion gap 20, glucose 300s-500s, normal creatinine, lactic acid increasing 2-->3, A1c 14.    On exam patient ill-appearing, toxic, dry mucous membranes, tachycardic, regular rhythm, no murmurs, CTAB, abdomen distended, soft, high-pitched tympanic bowel sounds, diffuse tenderness, no rebound, no guarding.    Worried about sepsis.  Discussed with bedside Rn, started sepsis protocol: LR bolus (standard sepsis dosing), increased IVF, blood cx, CTX/Flagyl. Transfer to tele unit for higher level of care.      When he arrived to tele I was notified to come to bedside, HR 150s.  Removed NG, NG replaced. ECG reviewed,  sinus tachycardia, bolus currently being given, monitored HR, improving with IVF.  Surgery also came to re-evaluate, having BMs and bowel sounds, stable from surgical standpoint.  Increasing oxygen demands, reviewed xray, low lung volumes, likely atelectasis and pain.     #Severe sepsis    Patient is critically ill, greater than 36 minutes of critical care time were spent in the admission, planning, and management of this patient not including procedures without overlap.       ADDENDUM 17:35  Repeat LA increasing 3-->4.8, HR 130s, BP stable  NG output increasing almost 1L, will give another 1L bolus   Imaging showing fatty liver, unsure if etoh, although patient  denies use, will give dose of thiamine d/t LA  Transferring to [facility], spoke to Dr. Hyde who agrees with transfer  Updated surgery APRN Kristen  Sugars improving to 300s

## 2023-08-13 LAB
ALBUMIN SERPL BCP-MCNC: 2.7 G/DL (ref 3.2–4.9)
ALBUMIN/GLOB SERPL: 1 G/DL
ALP SERPL-CCNC: 85 U/L (ref 30–99)
ALT SERPL-CCNC: 38 U/L (ref 2–50)
ANION GAP SERPL CALC-SCNC: 10 MMOL/L (ref 7–16)
AST SERPL-CCNC: 25 U/L (ref 12–45)
BASOPHILS # BLD AUTO: 0.9 % (ref 0–1.8)
BASOPHILS # BLD: 0.09 K/UL (ref 0–0.12)
BILIRUB SERPL-MCNC: 1 MG/DL (ref 0.1–1.5)
BUN SERPL-MCNC: 20 MG/DL (ref 8–22)
CALCIUM ALBUM COR SERPL-MCNC: 8.6 MG/DL (ref 8.5–10.5)
CALCIUM SERPL-MCNC: 7.6 MG/DL (ref 8.5–10.5)
CHLORIDE SERPL-SCNC: 106 MMOL/L (ref 96–112)
CO2 SERPL-SCNC: 31 MMOL/L (ref 20–33)
CREAT SERPL-MCNC: 0.45 MG/DL (ref 0.5–1.4)
EOSINOPHIL # BLD AUTO: 0 K/UL (ref 0–0.51)
EOSINOPHIL NFR BLD: 0 % (ref 0–6.9)
ERYTHROCYTE [DISTWIDTH] IN BLOOD BY AUTOMATED COUNT: 43.3 FL (ref 35.9–50)
GFR SERPLBLD CREATININE-BSD FMLA CKD-EPI: 136 ML/MIN/1.73 M 2
GLOBULIN SER CALC-MCNC: 2.8 G/DL (ref 1.9–3.5)
GLUCOSE BLD STRIP.AUTO-MCNC: 235 MG/DL (ref 65–99)
GLUCOSE BLD STRIP.AUTO-MCNC: 241 MG/DL (ref 65–99)
GLUCOSE BLD STRIP.AUTO-MCNC: 258 MG/DL (ref 65–99)
GLUCOSE BLD STRIP.AUTO-MCNC: 266 MG/DL (ref 65–99)
GLUCOSE BLD STRIP.AUTO-MCNC: 277 MG/DL (ref 65–99)
GLUCOSE SERPL-MCNC: 268 MG/DL (ref 65–99)
HCT VFR BLD AUTO: 51 % (ref 42–52)
HGB BLD-MCNC: 16.4 G/DL (ref 14–18)
LACTATE SERPL-SCNC: 1.4 MMOL/L (ref 0.5–2)
LACTATE SERPL-SCNC: 1.5 MMOL/L (ref 0.5–2)
LYMPHOCYTES # BLD AUTO: 1.58 K/UL (ref 1–4.8)
LYMPHOCYTES NFR BLD: 16.1 % (ref 22–41)
MAGNESIUM SERPL-MCNC: 2.1 MG/DL (ref 1.5–2.5)
MANUAL DIFF BLD: NORMAL
MCH RBC QN AUTO: 27.7 PG (ref 27–33)
MCHC RBC AUTO-ENTMCNC: 32.2 G/DL (ref 32.3–36.5)
MCV RBC AUTO: 86.3 FL (ref 81.4–97.8)
MONOCYTES # BLD AUTO: 1.08 K/UL (ref 0–0.85)
MONOCYTES NFR BLD AUTO: 11 % (ref 0–13.4)
MORPHOLOGY BLD-IMP: NORMAL
NEUTROPHILS # BLD AUTO: 7.06 K/UL (ref 1.82–7.42)
NEUTROPHILS NFR BLD: 72 % (ref 44–72)
NRBC # BLD AUTO: 0 K/UL
NRBC BLD-RTO: 0 /100 WBC (ref 0–0.2)
PHOSPHATE SERPL-MCNC: 2.5 MG/DL (ref 2.5–4.5)
PLATELET # BLD AUTO: 207 K/UL (ref 164–446)
PLATELET BLD QL SMEAR: NORMAL
PMV BLD AUTO: 11.8 FL (ref 9–12.9)
POTASSIUM SERPL-SCNC: 3.3 MMOL/L (ref 3.6–5.5)
PROT SERPL-MCNC: 5.5 G/DL (ref 6–8.2)
RBC # BLD AUTO: 5.91 M/UL (ref 4.7–6.1)
RBC BLD AUTO: NORMAL
SODIUM SERPL-SCNC: 147 MMOL/L (ref 135–145)
WBC # BLD AUTO: 9.8 K/UL (ref 4.8–10.8)

## 2023-08-13 PROCEDURE — 82962 GLUCOSE BLOOD TEST: CPT | Mod: 91

## 2023-08-13 PROCEDURE — 80053 COMPREHEN METABOLIC PANEL: CPT

## 2023-08-13 PROCEDURE — 700111 HCHG RX REV CODE 636 W/ 250 OVERRIDE (IP): Performed by: STUDENT IN AN ORGANIZED HEALTH CARE EDUCATION/TRAINING PROGRAM

## 2023-08-13 PROCEDURE — 700111 HCHG RX REV CODE 636 W/ 250 OVERRIDE (IP): Mod: JZ | Performed by: HOSPITALIST

## 2023-08-13 PROCEDURE — 700102 HCHG RX REV CODE 250 W/ 637 OVERRIDE(OP): Performed by: INTERNAL MEDICINE

## 2023-08-13 PROCEDURE — 99233 SBSQ HOSP IP/OBS HIGH 50: CPT | Performed by: HOSPITALIST

## 2023-08-13 PROCEDURE — 85007 BL SMEAR W/DIFF WBC COUNT: CPT

## 2023-08-13 PROCEDURE — 83735 ASSAY OF MAGNESIUM: CPT

## 2023-08-13 PROCEDURE — 83605 ASSAY OF LACTIC ACID: CPT | Mod: 91

## 2023-08-13 PROCEDURE — 770001 HCHG ROOM/CARE - MED/SURG/GYN PRIV*

## 2023-08-13 PROCEDURE — 84100 ASSAY OF PHOSPHORUS: CPT

## 2023-08-13 PROCEDURE — 700105 HCHG RX REV CODE 258: Performed by: STUDENT IN AN ORGANIZED HEALTH CARE EDUCATION/TRAINING PROGRAM

## 2023-08-13 PROCEDURE — 99231 SBSQ HOSP IP/OBS SF/LOW 25: CPT | Performed by: SURGERY

## 2023-08-13 PROCEDURE — 700101 HCHG RX REV CODE 250: Performed by: INTERNAL MEDICINE

## 2023-08-13 PROCEDURE — 85025 COMPLETE CBC W/AUTO DIFF WBC: CPT

## 2023-08-13 PROCEDURE — 700111 HCHG RX REV CODE 636 W/ 250 OVERRIDE (IP): Performed by: INTERNAL MEDICINE

## 2023-08-13 RX ORDER — ENOXAPARIN SODIUM 100 MG/ML
40 INJECTION SUBCUTANEOUS 2 TIMES DAILY
Status: DISCONTINUED | OUTPATIENT
Start: 2023-08-13 | End: 2023-08-15 | Stop reason: HOSPADM

## 2023-08-13 RX ADMIN — METRONIDAZOLE 500 MG: 5 INJECTION, SOLUTION INTRAVENOUS at 17:25

## 2023-08-13 RX ADMIN — POTASSIUM CHLORIDE AND SODIUM CHLORIDE: 900; 300 INJECTION, SOLUTION INTRAVENOUS at 13:03

## 2023-08-13 RX ADMIN — INSULIN HUMAN 4 UNITS: 100 INJECTION, SOLUTION PARENTERAL at 06:05

## 2023-08-13 RX ADMIN — CEFTRIAXONE SODIUM 2000 MG: 10 INJECTION, POWDER, FOR SOLUTION INTRAVENOUS at 12:16

## 2023-08-13 RX ADMIN — INSULIN HUMAN 4 UNITS: 100 INJECTION, SOLUTION PARENTERAL at 17:17

## 2023-08-13 RX ADMIN — INSULIN GLARGINE-YFGN 15 UNITS: 100 INJECTION, SOLUTION SUBCUTANEOUS at 17:16

## 2023-08-13 RX ADMIN — INSULIN HUMAN 7 UNITS: 100 INJECTION, SOLUTION PARENTERAL at 23:32

## 2023-08-13 RX ADMIN — THIAMINE HYDROCHLORIDE 500 MG: 100 INJECTION, SOLUTION INTRAMUSCULAR; INTRAVENOUS at 15:27

## 2023-08-13 RX ADMIN — ENOXAPARIN SODIUM 40 MG: 100 INJECTION SUBCUTANEOUS at 17:16

## 2023-08-13 RX ADMIN — THIAMINE HYDROCHLORIDE 500 MG: 100 INJECTION, SOLUTION INTRAMUSCULAR; INTRAVENOUS at 09:47

## 2023-08-13 RX ADMIN — THIAMINE HYDROCHLORIDE 500 MG: 100 INJECTION, SOLUTION INTRAMUSCULAR; INTRAVENOUS at 21:20

## 2023-08-13 RX ADMIN — INSULIN GLARGINE-YFGN 15 UNITS: 100 INJECTION, SOLUTION SUBCUTANEOUS at 06:05

## 2023-08-13 RX ADMIN — POTASSIUM CHLORIDE AND SODIUM CHLORIDE: 900; 300 INJECTION, SOLUTION INTRAVENOUS at 05:59

## 2023-08-13 RX ADMIN — INSULIN HUMAN 7 UNITS: 100 INJECTION, SOLUTION PARENTERAL at 12:16

## 2023-08-13 RX ADMIN — POTASSIUM CHLORIDE AND SODIUM CHLORIDE: 900; 300 INJECTION, SOLUTION INTRAVENOUS at 20:25

## 2023-08-13 RX ADMIN — INSULIN HUMAN 7 UNITS: 100 INJECTION, SOLUTION PARENTERAL at 00:43

## 2023-08-13 RX ADMIN — METRONIDAZOLE 500 MG: 5 INJECTION, SOLUTION INTRAVENOUS at 05:59

## 2023-08-13 ASSESSMENT — ENCOUNTER SYMPTOMS
DOUBLE VISION: 0
NAUSEA: 0
FEVER: 0
DIARRHEA: 1
VOMITING: 0
ABDOMINAL PAIN: 1
SORE THROAT: 0
HEADACHES: 0
SHORTNESS OF BREATH: 0
CHILLS: 0
LOSS OF CONSCIOUSNESS: 0
DIZZINESS: 0
PALPITATIONS: 0
BLURRED VISION: 0
COUGH: 0
BACK PAIN: 0

## 2023-08-13 ASSESSMENT — PAIN DESCRIPTION - PAIN TYPE
TYPE: ACUTE PAIN

## 2023-08-13 NOTE — DIETARY
NUTRITION SERVICES:   Day 1 of admit.  Bautista Mcdermott is a 42 y.o. male with admitting DX of Small bowel obstruction (HCC)     BMI - Pt with BMI >40 (=Body mass index is 41.6 kg/m².), Class III obesity. Weight loss counseling not appropriate in acute care setting. RECOMMEND - If appropriate at DC please refer to outpatient nutrition services for weight management.     RD consulted for diabetic diet education. Pt currently admitted to IMCU for SBO. Pt NPO x2. Previous hx of diabetes. Due to acute illness and NPO status diabetic diet education not appropriate at this time. Please re consult as indicated and appropriate.

## 2023-08-13 NOTE — CARE PLAN
"  Problem: Knowledge Deficit - Standard  Goal: Patient and family/care givers will demonstrate understanding of plan of care, disease process/condition, diagnostic tests and medications  Outcome: Progressing  Note: Discuss and review POC with patient/family. Re-educate as needed.       Problem: Skin Integrity  Goal: Skin integrity is maintained or improved  Outcome: Progressing  Note: Assess skin and monitor for skin breakdown. Alleviate pressure to bony prominences and provide assistance with turning, repositioning, ROM and mobility as appropriate. Use of barrier cream as needed. Continue to monitor.          Problem: Hemodynamics  Goal: Patient's hemodynamics, fluid balance and neurologic status will be stable or improve  Outcome: Progressing  Note: Monitoring vitals      The patient is Watcher - Medium risk of patient condition declining or worsening    Shift Goals: Monitor vitals   Clinical Goals: IV ABX, Monitor heart rate, Monitor blood glucose  Patient Goals: \"I want water\"    Progress made toward(s) clinical / shift goals:  Patient alert and oriented, multiple bowel movements    Patient is not progressing towards the following goals: Labs       "

## 2023-08-13 NOTE — CARE PLAN
"The patient is Stable - Low risk of patient condition declining or worsening    Shift Goals  Clinical Goals: Manage pain, mobilize, IS  Patient Goals: \"I want water\"    Progress made toward(s) clinical / shift goals:  Mobilized x2, gave prn pain meds which helped SPO2  Problem: Pain - Standard  Goal: Alleviation of pain or a reduction in pain to the patient’s comfort goal  Outcome: Progressing     Problem: Hemodynamics  Goal: Patient's hemodynamics, fluid balance and neurologic status will be stable or improve  Outcome: Progressing     Problem: Fluid Volume  Goal: Fluid volume balance will be maintained  Outcome: Progressing       Patient is not progressing towards the following goals: frequent bouts of diarrhea       "

## 2023-08-13 NOTE — PROGRESS NOTES
Patient transferred to the IMCU with rapid RN's. All personal belongings collected and sent with patient. IV infusing. Monitor room notified. Family at bedside. Report given to Lance COBIAN RN.

## 2023-08-13 NOTE — CARE PLAN
The patient is Watcher - Medium risk of patient condition declining or worsening    Shift Goals  Clinical Goals: mobilize, hemodynamic stability  Patient Goals: Get better  Family Goals: BILLY    Progress made toward(s) clinical / shift goals:    Problem: Knowledge Deficit - Standard  Goal: Patient and family/care givers will demonstrate understanding of plan of care, disease process/condition, diagnostic tests and medications  Outcome: Progressing     Problem: Pain - Standard  Goal: Alleviation of pain or a reduction in pain to the patient’s comfort goal  Outcome: Progressing     Problem: Hemodynamics  Goal: Patient's hemodynamics, fluid balance and neurologic status will be stable or improve  Outcome: Progressing     Problem: Respiratory  Goal: Patient will achieve/maintain optimum respiratory ventilation and gas exchange  Outcome: Progressing

## 2023-08-13 NOTE — PROGRESS NOTES
[facility] Staff    Had multiple bowel movements overnight and passing gas. Hungry.  IMCU transfer for cardiac reasons. Lactate repeatedly normal.   /70   Pulse (!) 56   Temp 36.3 °C (97.3 °F) (Temporal)   Resp (!) 22   Wt 113 kg (250 lb)   SpO2 90%   BMI 41.60 kg/m²   NAD  NG in place  CTA B  Abdomen soft, ND. Minimal TTP in LUQ.    WBC normalized.    A/P: resolving SBO  D/c NG tube, trial clear liquids.  No plans for surgery. DVT ppx ok.  Will follow.

## 2023-08-13 NOTE — PROGRESS NOTES
Dr. Delgado notified regarding patient's lactic acid trending up, blood glucose level, NG suction output, and condition. New orders to transfer to [facility] at 1743.

## 2023-08-13 NOTE — PROGRESS NOTES
[facility] Medicine Daily Progress Note    Date of Service  8/13/2023    Chief Complaint  Bautista Mcdermott is a 42 y.o. male admitted 8/11/2023 with abd pain    Hospital Course  43yo PMHx HTN, DM, HLD.  Surgical history significant for ruptured appendicitis and perforated bowel which the patient suffered on the same occasion when he was in his early teens.  He had a partial bowel resection and a colostomy which was later reversed.  He has had no abdominal surgery since.  Presented on 8/10 with abd pain.  he was noted to be in SVT which converted with adneomsine.  CT showed gastroenteritis and DEx'd home from the ED. She presented back on 8/11 for the same issue and found to have SBO     Interval Problem Update  Patient states his pain is much better today.  He still has some pain, but its much less intense.  The pain is located primarily in the lower abdomen.  He has been having multiple bowel movements, but they are all water, coming almost every 30 minutes.  No nausea or vomiting in fact he is hungry.  No fever or chills.    I have discussed this patient's plan of care and discharge plan at IDT rounds today with Case Management, Nursing, Nursing leadership, and other members of the IDT team.    Consultants/Specialty  general surgery    Code Status  Full Code    Disposition  The patient is not medically cleared for discharge to home or a post-acute facility.      I have placed the appropriate orders for post-discharge needs.    Review of Systems  Review of Systems   Constitutional:  Negative for chills and fever.   HENT:  Negative for nosebleeds and sore throat.    Eyes:  Negative for blurred vision and double vision.   Respiratory:  Negative for cough and shortness of breath.    Cardiovascular:  Negative for chest pain, palpitations and leg swelling.   Gastrointestinal:  Positive for abdominal pain and diarrhea. Negative for nausea and vomiting.   Genitourinary:  Negative for dysuria and urgency.   Musculoskeletal:   Negative for back pain.   Skin:  Negative for rash.   Neurological:  Negative for dizziness, loss of consciousness and headaches.        Physical Exam  Temp:  [36.8 °C (98.3 °F)-37.6 °C (99.6 °F)] 37.6 °C (99.6 °F)  Pulse:  [] 65  Resp:  [18-28] 22  BP: (113-146)/(65-91) 127/77  SpO2:  [89 %-94 %] 90 %    Physical Exam  Constitutional:       General: He is not in acute distress.     Appearance: He is well-developed. He is not diaphoretic.   HENT:      Head: Normocephalic and atraumatic.   Eyes:      Conjunctiva/sclera: Conjunctivae normal.   Neck:      Vascular: No JVD.   Cardiovascular:      Rate and Rhythm: Normal rate.      Heart sounds: No murmur heard.     No gallop.   Pulmonary:      Effort: Pulmonary effort is normal. No respiratory distress.      Breath sounds: No stridor. No wheezing or rales.   Abdominal:      Palpations: Abdomen is soft.      Tenderness: There is abdominal tenderness. There is no guarding or rebound.      Comments: Soft throughout   Min TTP in lower abd  No G/R   Musculoskeletal:         General: No tenderness.      Right lower leg: No edema.      Left lower leg: No edema.   Skin:     General: Skin is warm and dry.      Findings: No rash.   Neurological:      General: No focal deficit present.      Mental Status: He is oriented to person, place, and time.   Psychiatric:         Thought Content: Thought content normal.         Fluids    Intake/Output Summary (Last 24 hours) at 8/13/2023 0654  Last data filed at 8/12/2023 2200  Gross per 24 hour   Intake 5017.55 ml   Output 3900 ml   Net 1117.55 ml       Laboratory  Recent Labs     08/11/23  1951 08/12/23  0423 08/13/23  0450   WBC 22.9* 22.7* 9.8   RBC 6.25* 6.33* 5.91   HEMOGLOBIN 17.7 18.3* 16.4   HEMATOCRIT 52.4* 53.9* 51.0   MCV 83.8 85.2 86.3   MCH 28.3 28.9 27.7   MCHC 33.8 34.0 32.2*   RDW 39.6 41.2 43.3   PLATELETCT 335 293 207   MPV 12.1 12.3 11.8     Recent Labs     08/12/23  0423 08/12/23  1303 08/13/23  0450   SODIUM 136  141 147*   POTASSIUM 3.6 3.5* 3.3*   CHLORIDE 93* 96 106   CO2 23 23 31   GLUCOSE 376* 455* 268*   BUN 19 32* 20   CREATININE 0.51 0.83 0.45*   CALCIUM 9.4 9.1 7.6*                   Imaging  DX-ABDOMEN FOR TUBE PLACEMENT   Final Result      1.  NG tube projects over the gastric fundus.      DX-ABDOMEN FOR TUBE PLACEMENT   Final Result      1.  NG tube extends into the fundus the stomach.      CT-ABDOMEN-PELVIS WITH   Final Result      1.  Acute distal small bowel obstruction.      2.  Hepatosplenomegaly with hepatic steatosis      CT-CTA CHEST PULMONARY ARTERY W/ RECONS   Final Result      1.  No CT evidence of pulmonary emboli.                 Assessment/Plan  * Small bowel obstruction (HCC)- (present on admission)  Assessment & Plan  Previous surgical history significant for ruptured appendicitis, and bowel perforation which he suffered in his early teens.  Patient had a colostomy for period of about 6 months which was then reversed.  CT A/P Showing Acute Distal SBO   clinically the patient is improved today  He is not having bowel moods, however is passing large amount of water.  Continue n.p.o. except for ice chips  Surgery following  Supportive care with IV fluids, antiemetics, repletion of electrolytes as appropriate.    Trial clamp NGT and clears.  If he tolerates will DC NGT in am      Severe sepsis (HCC)  Assessment & Plan  Present on admission  Source GI  Empiric antibiotics to cover abdominal source  Following cultures  CT scan was negative for drainable abscess, or other acute surgical history    Leukocytosis  Assessment & Plan  Likely reactive   PCT neg hold abx  Serial CBC    Lactic acidosis  Assessment & Plan  Likely secondary to SBO   resolved    Diabetes mellitus (HCC)  Assessment & Plan  On glargine, metformin and glipizide at home: Being held in house as the patient is n.p.o.  Start glargine to provide baseline control.  Continue sliding scale      Hypertension  Assessment & Plan  Hold po  medications at this time as patient has NGT to suction.  As needed coverage    Hyperlipidemia  Assessment & Plan  Hold po medications at this time as patient has NGT to suction.  IV as needed         VTE prophylaxis:    enoxaparin ppx      I have performed a physical exam and reviewed and updated ROS and Plan today (8/13/2023). In review of yesterday's note (8/12/2023), there are no changes except as documented above.

## 2023-08-14 PROBLEM — E87.6 HYPOKALEMIA: Status: ACTIVE | Noted: 2023-08-14

## 2023-08-14 PROBLEM — R65.20 SEVERE SEPSIS (HCC): Status: RESOLVED | Noted: 2023-08-12 | Resolved: 2023-08-14

## 2023-08-14 PROBLEM — E87.0 HYPERNATREMIA: Status: ACTIVE | Noted: 2023-08-14

## 2023-08-14 PROBLEM — A41.9 SEVERE SEPSIS (HCC): Status: RESOLVED | Noted: 2023-08-12 | Resolved: 2023-08-14

## 2023-08-14 PROBLEM — E87.20 LACTIC ACIDOSIS: Status: RESOLVED | Noted: 2023-08-12 | Resolved: 2023-08-14

## 2023-08-14 LAB
ANION GAP SERPL CALC-SCNC: 10 MMOL/L (ref 7–16)
BUN SERPL-MCNC: 11 MG/DL (ref 8–22)
CALCIUM SERPL-MCNC: 7.6 MG/DL (ref 8.5–10.5)
CHLORIDE SERPL-SCNC: 107 MMOL/L (ref 96–112)
CO2 SERPL-SCNC: 23 MMOL/L (ref 20–33)
CREAT SERPL-MCNC: 0.31 MG/DL (ref 0.5–1.4)
GFR SERPLBLD CREATININE-BSD FMLA CKD-EPI: 152 ML/MIN/1.73 M 2
GLUCOSE BLD STRIP.AUTO-MCNC: 178 MG/DL (ref 65–99)
GLUCOSE BLD STRIP.AUTO-MCNC: 233 MG/DL (ref 65–99)
GLUCOSE BLD STRIP.AUTO-MCNC: 268 MG/DL (ref 65–99)
GLUCOSE SERPL-MCNC: 227 MG/DL (ref 65–99)
POTASSIUM SERPL-SCNC: 4.2 MMOL/L (ref 3.6–5.5)
SODIUM SERPL-SCNC: 140 MMOL/L (ref 135–145)

## 2023-08-14 PROCEDURE — 99233 SBSQ HOSP IP/OBS HIGH 50: CPT | Performed by: INTERNAL MEDICINE

## 2023-08-14 PROCEDURE — 82962 GLUCOSE BLOOD TEST: CPT | Mod: 91

## 2023-08-14 PROCEDURE — 700111 HCHG RX REV CODE 636 W/ 250 OVERRIDE (IP): Performed by: STUDENT IN AN ORGANIZED HEALTH CARE EDUCATION/TRAINING PROGRAM

## 2023-08-14 PROCEDURE — 700101 HCHG RX REV CODE 250: Performed by: INTERNAL MEDICINE

## 2023-08-14 PROCEDURE — 700102 HCHG RX REV CODE 250 W/ 637 OVERRIDE(OP): Performed by: INTERNAL MEDICINE

## 2023-08-14 PROCEDURE — 700102 HCHG RX REV CODE 250 W/ 637 OVERRIDE(OP): Performed by: STUDENT IN AN ORGANIZED HEALTH CARE EDUCATION/TRAINING PROGRAM

## 2023-08-14 PROCEDURE — 700105 HCHG RX REV CODE 258: Performed by: STUDENT IN AN ORGANIZED HEALTH CARE EDUCATION/TRAINING PROGRAM

## 2023-08-14 PROCEDURE — 700105 HCHG RX REV CODE 258: Mod: JZ | Performed by: INTERNAL MEDICINE

## 2023-08-14 PROCEDURE — 99231 SBSQ HOSP IP/OBS SF/LOW 25: CPT | Performed by: NURSE PRACTITIONER

## 2023-08-14 PROCEDURE — 80048 BASIC METABOLIC PNL TOTAL CA: CPT

## 2023-08-14 PROCEDURE — 97602 WOUND(S) CARE NON-SELECTIVE: CPT

## 2023-08-14 PROCEDURE — 700111 HCHG RX REV CODE 636 W/ 250 OVERRIDE (IP): Mod: JZ | Performed by: HOSPITALIST

## 2023-08-14 PROCEDURE — 700111 HCHG RX REV CODE 636 W/ 250 OVERRIDE (IP): Performed by: INTERNAL MEDICINE

## 2023-08-14 PROCEDURE — A9270 NON-COVERED ITEM OR SERVICE: HCPCS | Performed by: INTERNAL MEDICINE

## 2023-08-14 PROCEDURE — 770001 HCHG ROOM/CARE - MED/SURG/GYN PRIV*

## 2023-08-14 RX ORDER — ONDANSETRON 4 MG/1
4 TABLET, ORALLY DISINTEGRATING ORAL EVERY 6 HOURS PRN
Status: DISCONTINUED | OUTPATIENT
Start: 2023-08-14 | End: 2023-08-14

## 2023-08-14 RX ORDER — LISINOPRIL 10 MG/1
10 TABLET ORAL DAILY
Status: DISCONTINUED | OUTPATIENT
Start: 2023-08-14 | End: 2023-08-15 | Stop reason: HOSPADM

## 2023-08-14 RX ORDER — ROSUVASTATIN CALCIUM 20 MG/1
20 TABLET, COATED ORAL
Status: DISCONTINUED | OUTPATIENT
Start: 2023-08-14 | End: 2023-08-15 | Stop reason: HOSPADM

## 2023-08-14 RX ORDER — SODIUM CHLORIDE, SODIUM LACTATE, POTASSIUM CHLORIDE, CALCIUM CHLORIDE 600; 310; 30; 20 MG/100ML; MG/100ML; MG/100ML; MG/100ML
INJECTION, SOLUTION INTRAVENOUS CONTINUOUS
Status: DISCONTINUED | OUTPATIENT
Start: 2023-08-14 | End: 2023-08-15 | Stop reason: HOSPADM

## 2023-08-14 RX ORDER — POTASSIUM CHLORIDE 20 MEQ/1
40 TABLET, EXTENDED RELEASE ORAL ONCE
Status: DISCONTINUED | OUTPATIENT
Start: 2023-08-14 | End: 2023-08-14

## 2023-08-14 RX ORDER — LOVASTATIN 20 MG/1
20 TABLET ORAL DAILY
Status: DISCONTINUED | OUTPATIENT
Start: 2023-08-14 | End: 2023-08-14

## 2023-08-14 RX ORDER — OMEPRAZOLE 20 MG/1
20 CAPSULE, DELAYED RELEASE ORAL DAILY
Status: DISCONTINUED | OUTPATIENT
Start: 2023-08-14 | End: 2023-08-15 | Stop reason: HOSPADM

## 2023-08-14 RX ADMIN — INSULIN HUMAN 3 UNITS: 100 INJECTION, SOLUTION PARENTERAL at 06:34

## 2023-08-14 RX ADMIN — SODIUM CHLORIDE, POTASSIUM CHLORIDE, SODIUM LACTATE AND CALCIUM CHLORIDE: 600; 310; 30; 20 INJECTION, SOLUTION INTRAVENOUS at 18:17

## 2023-08-14 RX ADMIN — OMEPRAZOLE 20 MG: 20 CAPSULE, DELAYED RELEASE ORAL at 09:34

## 2023-08-14 RX ADMIN — INSULIN HUMAN 4 UNITS: 100 INJECTION, SOLUTION PARENTERAL at 12:27

## 2023-08-14 RX ADMIN — THIAMINE HYDROCHLORIDE 500 MG: 100 INJECTION, SOLUTION INTRAMUSCULAR; INTRAVENOUS at 09:25

## 2023-08-14 RX ADMIN — LISINOPRIL 10 MG: 10 TABLET ORAL at 09:33

## 2023-08-14 RX ADMIN — ENOXAPARIN SODIUM 40 MG: 100 INJECTION SUBCUTANEOUS at 18:20

## 2023-08-14 RX ADMIN — POTASSIUM CHLORIDE AND SODIUM CHLORIDE: 900; 300 INJECTION, SOLUTION INTRAVENOUS at 03:19

## 2023-08-14 RX ADMIN — INSULIN GLARGINE-YFGN 15 UNITS: 100 INJECTION, SOLUTION SUBCUTANEOUS at 06:33

## 2023-08-14 RX ADMIN — INSULIN HUMAN 7 UNITS: 100 INJECTION, SOLUTION PARENTERAL at 18:22

## 2023-08-14 RX ADMIN — ENOXAPARIN SODIUM 40 MG: 100 INJECTION SUBCUTANEOUS at 06:19

## 2023-08-14 RX ADMIN — THIAMINE HYDROCHLORIDE 500 MG: 100 INJECTION, SOLUTION INTRAMUSCULAR; INTRAVENOUS at 15:22

## 2023-08-14 RX ADMIN — THIAMINE HYDROCHLORIDE 500 MG: 100 INJECTION, SOLUTION INTRAMUSCULAR; INTRAVENOUS at 21:06

## 2023-08-14 RX ADMIN — METRONIDAZOLE 500 MG: 5 INJECTION, SOLUTION INTRAVENOUS at 06:24

## 2023-08-14 RX ADMIN — POTASSIUM CHLORIDE AND SODIUM CHLORIDE: 900; 300 INJECTION, SOLUTION INTRAVENOUS at 11:24

## 2023-08-14 RX ADMIN — ROSUVASTATIN CALCIUM 20 MG: 20 TABLET, FILM COATED ORAL at 18:20

## 2023-08-14 ASSESSMENT — COGNITIVE AND FUNCTIONAL STATUS - GENERAL
SUGGESTED CMS G CODE MODIFIER MOBILITY: CH
SUGGESTED CMS G CODE MODIFIER DAILY ACTIVITY: CH
DAILY ACTIVITIY SCORE: 24
MOBILITY SCORE: 24

## 2023-08-14 ASSESSMENT — PAIN DESCRIPTION - PAIN TYPE
TYPE: ACUTE PAIN

## 2023-08-14 ASSESSMENT — ENCOUNTER SYMPTOMS
SORE THROAT: 1
CARDIOVASCULAR NEGATIVE: 1
VOMITING: 0
EYES NEGATIVE: 1
NAUSEA: 0

## 2023-08-14 NOTE — PROGRESS NOTES
DATE: 8/14/2023    Hospital Day 3 Surgical consult for SBO    INTERVAL EVENTS:  NG tube remains clamped  Residual checked at bedside and NG removed by Dr. Dillon .  Advance diet  BMP and mag in am.   No acute operative plan at this time.    REVIEW OF SYSTEMS:  Review of Systems   HENT:  Positive for sore throat.    Eyes: Negative.    Cardiovascular: Negative.    Gastrointestinal:  Negative for nausea and vomiting.       PHYSICAL EXAMINATION:  Vital Signs: /87   Pulse 74   Temp 36.7 °C (98.1 °F) (Temporal)   Resp 17   Wt 113 kg (250 lb)   SpO2 93%   Physical Exam  Vitals and nursing note reviewed.   HENT:      Nose:      Comments: NG removed  Pulmonary:      Effort: Pulmonary effort is normal.   Abdominal:      General: There is distension (soft.).      Palpations: Abdomen is soft.      Tenderness: There is no guarding.   Skin:     General: Skin is warm and dry.   Neurological:      General: No focal deficit present.      Mental Status: He is oriented to person, place, and time.   Psychiatric:         Mood and Affect: Mood normal.         Thought Content: Thought content normal.         LABORATORY VALUES:   Recent Labs     08/11/23 1951 08/12/23 0423 08/13/23 0450   WBC 22.9* 22.7* 9.8   RBC 6.25* 6.33* 5.91   HEMOGLOBIN 17.7 18.3* 16.4   HEMATOCRIT 52.4* 53.9* 51.0   MCV 83.8 85.2 86.3   MCH 28.3 28.9 27.7   MCHC 33.8 34.0 32.2*   RDW 39.6 41.2 43.3   PLATELETCT 335 293 207   MPV 12.1 12.3 11.8     Recent Labs     08/12/23  1303 08/13/23  0450 08/14/23  1001   SODIUM 141 147* 140   POTASSIUM 3.5* 3.3* 4.2   CHLORIDE 96 106 107   CO2 23 31 23   GLUCOSE 455* 268* 227*   BUN 32* 20 11   CREATININE 0.83 0.45* 0.31*   CALCIUM 9.1 7.6* 7.6*     Recent Labs     08/11/23 1951 08/13/23  0450   ASTSGOT 22 25   ALTSGPT 52* 38   TBILIRUBIN 1.4 1.0   ALKPHOSPHAT 152* 85   GLOBULIN 3.6* 2.8            IMAGING:   DX-ABDOMEN FOR TUBE PLACEMENT   Final Result      1.  NG tube projects over the gastric fundus.       DX-ABDOMEN FOR TUBE PLACEMENT   Final Result      1.  NG tube extends into the fundus the stomach.      CT-ABDOMEN-PELVIS WITH   Final Result      1.  Acute distal small bowel obstruction.      2.  Hepatosplenomegaly with hepatic steatosis      CT-CTA CHEST PULMONARY ARTERY W/ RECONS   Final Result      1.  No CT evidence of pulmonary emboli.                Core Measures & Quality Metrics    ASSESSMENT AND PLAN:   * Small bowel obstruction (HCC)- (present on admission)  Assessment & Plan  NG tube to suction.  8/13 SBFT cnx due to multiple BM. Ng tube clamped. Clear liquids  8/14 NG removed. Advance diet.         Discussed patient condition with RN, Patient, and general surgery.

## 2023-08-14 NOTE — PROGRESS NOTES
Report given to Alessandra ZURITA. Pt transported to T412/2 at 1745 with transport. Family at bedside and educated on new room. VSS. All belongings transported with pt.

## 2023-08-14 NOTE — CARE PLAN
The patient is Stable - Low risk of patient condition declining or worsening    Shift Goals  Clinical Goals: tolerate full liquid diet, pain/nausea control  Patient Goals: recover  Family Goals: go home    Progress made toward(s) clinical / shift goals:  Patient is tolerating advancement of diet to full liquids without nausea. Tele monitoring has been discontinued.     Patient is not progressing towards the following goals: Discharge clearance.    Problem: Knowledge Deficit - Standard  Goal: Patient and family/care givers will demonstrate understanding of plan of care, disease process/condition, diagnostic tests and medications  Outcome: Progressing     Problem: Skin Integrity  Goal: Skin integrity is maintained or improved  Outcome: Progressing     Problem: Fall Risk  Goal: Patient will remain free from falls  Outcome: Progressing     Problem: Pain - Standard  Goal: Alleviation of pain or a reduction in pain to the patient’s comfort goal  Outcome: Progressing

## 2023-08-14 NOTE — WOUND TEAM
[facility]  Inpatient Services  Wound and Skin Care Brief Evaluation    Admission Date: 8/11/2023     Last order of IP CONSULT TO WOUND CARE was found on 8/13/2023 from Hospital Encounter on 8/11/2023     HPI, PMH, SH: Reviewed    Chief Complaint   Patient presents with    Abdominal Pain     Pt c/o generalized abdominal pain that began yesterday, s/s heart burn, subjective fever.      Diagnosis: Small bowel obstruction (HCC) [K56.609]    Unit where seen by Wound Team: T412/02     Wound consult placed regarding Sacrum and LLE. Chart and images reviewed. This discussed with bedside RN. This RN in to assess patient. Pt pleasant and agreeable. BLE assessed pt has what appears to be varicose veins to the LLE. Pt states he has had them for years and has been told insurance won't cover surgery because it is cosmetic. Pt reports it is only painful if he doesn't walk for a while. Leg left open to air. Pts heels assessed and are intact, heel offloading dressing ordered (none available on unit). Pt was then able to turn self to the left side. Sacrococcygeal area assessed area has some normal discoloration which appears to be related to venous pooling and is blanching. Left open to air. Sacral offloading dressing ordered (None available on unit). Right nare checked due to NG Tube being in place. No open wounds noted.  Per pt NG tube is being removed today.    No pressure injuries or advanced wound care needs identified. Wound consult completed. No further follow up unless indicated and consulted.                           PREVENTATIVE INTERVENTIONS:    Q shift Kane - performed per nursing policy  Q shift pressure point assessments - performed per nursing policy    Surface/Positioning  Standard/trauma mattress - Currently in Place  Waffle overlay  - Ordered    Offloading/Redistribution  Sacral offloading dressing (Silicone dressing) - Ordered  Heel offloading dressing (Silicone dressing) -  Ordered      Containment/Moisture Prevention    Interdry - Ordered    Mobilization      Up to chair and Ambulating at Baseline

## 2023-08-14 NOTE — PROGRESS NOTES
Pt admitted to room T412 via transport in [facility] from [facility] at 1748.      AA&Ox4. Denies CP/SOB.  See 2 RN skin note  Tolerating clear liquid diet. Denies N/V.  + void. + BM. Last BM 8/13/23  Pt ambulates SBA.    Plan of care discussed, all questions answered. Educated on the importance of calling before getting OOB and pt verbalizes understanding. Educated regarding importance of oral care. Oral care kit at bedside. Call light is within reach, treaded slipper socks on, bed in lowest/ locked position, hourly rounding in place, all needs met at this time

## 2023-08-14 NOTE — THERAPY
08/14/23 165   Interdisciplinary Plan of Care Collaboration   Collaboration Comments OT consult received. Discussed with RN and met with pt at bedside. Pt reported no difficulties completing ADLs at this time. Pt declined formal evaluation. Order will be completed. Please re-order should status change.

## 2023-08-14 NOTE — THERAPY
Physical Therapy Contact Note    Patient Name: Bautista Mcdremott  Age:  42 y.o., Sex:  male  Medical Record #: [id number]  Today's Date: 8/14/2023    PT consult received and chart reviewed. Order placed due to low 6 clicks score, which was not filled out accurately based on pt mobility. Pt is able to mobilize independently except for management of IV line per discussion with RN. PT eval not indicated.    Estefany Pride, PT, DPT

## 2023-08-14 NOTE — PROGRESS NOTES
Bedside report received.  Assessment complete.  A&O x 4. Patient calls appropriately.  Patient ambulates with standby assist.  Patient has 0/10 pain. Declined intervention at this time.  R keith MARTINEZ clamped.  Tolerating clear liquid diet. Denies N/V.  + void, + flatus, + BM. Last BM 4/13.  SCD's on.  Reviewed plan of care with patient. Call light and personal belongings within reach. Hourly rounding in place. All needs met at this time.

## 2023-08-14 NOTE — PROGRESS NOTES
4 Eyes Skin Assessment Completed by Alessandra RN and YUDITH Saab.     Head WDL  Ears Redness and Blanching  Nose WDL  Mouth WDL  Neck WDL  Breast/Chest WDL  Shoulder Blades WDL  Spine WDL  (R) Arm/Elbow/Hand WDL  (L) Arm/Elbow/Hand WDL  Abdomen Distended and old scar  Groin Excoriation  Scrotum/Coccyx/Buttocks Redness, Non-Blanching, and Excoriation  (R) Leg Bruising  (L) Leg Bruising, discoloration, lumps  (R) Heel/Foot/Toe Redness and Blanching  (L) Heel/Foot/Toe Redness and Blanching       Devices In Places Tele Box, Blood Pressure Cuff, Pulse Ox, OG/NG, and Nasal Cannula     Interventions In Place Gray Ear Foams, NC W/Ear Foams, Pillows, Barrier Cream, and Heels Loaded W/Pillows     Possible Skin Injury No     Pictures Uploaded Into Epic Yes  Wound Consult Placed Already following  RN Wound Prevention Protocol Ordered Already ordered

## 2023-08-15 ENCOUNTER — PATIENT OUTREACH (OUTPATIENT)
Dept: SCHEDULING | Facility: IMAGING CENTER | Age: 43
End: 2023-08-15
Payer: COMMERCIAL

## 2023-08-15 ENCOUNTER — PHARMACY VISIT (OUTPATIENT)
Dept: PHARMACY | Facility: MEDICAL CENTER | Age: 43
End: 2023-08-15
Payer: COMMERCIAL

## 2023-08-15 VITALS
HEART RATE: 65 BPM | TEMPERATURE: 97.9 F | DIASTOLIC BLOOD PRESSURE: 68 MMHG | OXYGEN SATURATION: 92 % | RESPIRATION RATE: 17 BRPM | SYSTOLIC BLOOD PRESSURE: 113 MMHG | WEIGHT: 250 LBS | BODY MASS INDEX: 41.6 KG/M2

## 2023-08-15 PROBLEM — E87.6 HYPOKALEMIA: Status: RESOLVED | Noted: 2023-08-14 | Resolved: 2023-08-15

## 2023-08-15 PROBLEM — K56.609 SMALL BOWEL OBSTRUCTION (HCC): Status: RESOLVED | Noted: 2023-08-12 | Resolved: 2023-08-15

## 2023-08-15 PROBLEM — D72.829 LEUKOCYTOSIS: Status: RESOLVED | Noted: 2023-08-12 | Resolved: 2023-08-15

## 2023-08-15 PROBLEM — E87.0 HYPERNATREMIA: Status: RESOLVED | Noted: 2023-08-14 | Resolved: 2023-08-15

## 2023-08-15 LAB
ANION GAP SERPL CALC-SCNC: 8 MMOL/L (ref 7–16)
BUN SERPL-MCNC: 7 MG/DL (ref 8–22)
CALCIUM SERPL-MCNC: 8 MG/DL (ref 8.5–10.5)
CHLORIDE SERPL-SCNC: 106 MMOL/L (ref 96–112)
CO2 SERPL-SCNC: 24 MMOL/L (ref 20–33)
CREAT SERPL-MCNC: 0.35 MG/DL (ref 0.5–1.4)
GFR SERPLBLD CREATININE-BSD FMLA CKD-EPI: 147 ML/MIN/1.73 M 2
GLUCOSE BLD STRIP.AUTO-MCNC: 162 MG/DL (ref 65–99)
GLUCOSE BLD STRIP.AUTO-MCNC: 165 MG/DL (ref 65–99)
GLUCOSE SERPL-MCNC: 184 MG/DL (ref 65–99)
MAGNESIUM SERPL-MCNC: 1.8 MG/DL (ref 1.5–2.5)
POTASSIUM SERPL-SCNC: 3.6 MMOL/L (ref 3.6–5.5)
SODIUM SERPL-SCNC: 138 MMOL/L (ref 135–145)

## 2023-08-15 PROCEDURE — 700105 HCHG RX REV CODE 258: Performed by: STUDENT IN AN ORGANIZED HEALTH CARE EDUCATION/TRAINING PROGRAM

## 2023-08-15 PROCEDURE — 700105 HCHG RX REV CODE 258: Mod: JZ | Performed by: INTERNAL MEDICINE

## 2023-08-15 PROCEDURE — 700111 HCHG RX REV CODE 636 W/ 250 OVERRIDE (IP): Mod: JZ | Performed by: HOSPITALIST

## 2023-08-15 PROCEDURE — 700111 HCHG RX REV CODE 636 W/ 250 OVERRIDE (IP): Performed by: STUDENT IN AN ORGANIZED HEALTH CARE EDUCATION/TRAINING PROGRAM

## 2023-08-15 PROCEDURE — 99231 SBSQ HOSP IP/OBS SF/LOW 25: CPT | Performed by: REGISTERED NURSE

## 2023-08-15 PROCEDURE — A9270 NON-COVERED ITEM OR SERVICE: HCPCS | Performed by: INTERNAL MEDICINE

## 2023-08-15 PROCEDURE — RXMED WILLOW AMBULATORY MEDICATION CHARGE: Performed by: HOSPITALIST

## 2023-08-15 PROCEDURE — 82962 GLUCOSE BLOOD TEST: CPT | Mod: 91

## 2023-08-15 PROCEDURE — 99239 HOSP IP/OBS DSCHRG MGMT >30: CPT | Performed by: HOSPITALIST

## 2023-08-15 PROCEDURE — 83735 ASSAY OF MAGNESIUM: CPT

## 2023-08-15 PROCEDURE — 700102 HCHG RX REV CODE 250 W/ 637 OVERRIDE(OP): Performed by: INTERNAL MEDICINE

## 2023-08-15 PROCEDURE — 80048 BASIC METABOLIC PNL TOTAL CA: CPT

## 2023-08-15 RX ORDER — INSULIN GLARGINE 100 [IU]/ML
35 INJECTION, SOLUTION SUBCUTANEOUS EVERY EVENING
Qty: 15 ML | Refills: 0 | Status: ACTIVE | OUTPATIENT
Start: 2023-08-15

## 2023-08-15 RX ADMIN — INSULIN HUMAN 3 UNITS: 100 INJECTION, SOLUTION PARENTERAL at 06:17

## 2023-08-15 RX ADMIN — INSULIN HUMAN 3 UNITS: 100 INJECTION, SOLUTION PARENTERAL at 00:17

## 2023-08-15 RX ADMIN — LISINOPRIL 10 MG: 10 TABLET ORAL at 06:23

## 2023-08-15 RX ADMIN — ENOXAPARIN SODIUM 40 MG: 100 INJECTION SUBCUTANEOUS at 06:20

## 2023-08-15 RX ADMIN — SODIUM CHLORIDE, POTASSIUM CHLORIDE, SODIUM LACTATE AND CALCIUM CHLORIDE: 600; 310; 30; 20 INJECTION, SOLUTION INTRAVENOUS at 07:30

## 2023-08-15 RX ADMIN — THIAMINE HYDROCHLORIDE 500 MG: 100 INJECTION, SOLUTION INTRAMUSCULAR; INTRAVENOUS at 07:44

## 2023-08-15 RX ADMIN — OMEPRAZOLE 20 MG: 20 CAPSULE, DELAYED RELEASE ORAL at 06:22

## 2023-08-15 ASSESSMENT — ENCOUNTER SYMPTOMS
VOMITING: 0
CARDIOVASCULAR NEGATIVE: 1
SORE THROAT: 1
NAUSEA: 0
EYES NEGATIVE: 1

## 2023-08-15 ASSESSMENT — PAIN DESCRIPTION - PAIN TYPE: TYPE: ACUTE PAIN

## 2023-08-15 NOTE — CARE PLAN
The patient is Stable - Low risk of patient condition declining or worsening    Shift Goals  Clinical Goals: discharge home  Patient Goals: recover and go home  Family Goals: not currently present    Progress made toward(s) clinical / shift goals:  Vital signs have remained stable. Patient reports pain to be well controlled without use of pain medication. Patient remains ambulatory and does not require supplemental oxygen. Patient is tolerating diet without nausea or vomiting. Discharge orders have been received.    Patient is not progressing towards the following goals: N/A    Problem: Knowledge Deficit - Standard  Goal: Patient and family/care givers will demonstrate understanding of plan of care, disease process/condition, diagnostic tests and medications  Outcome: Met     Problem: Skin Integrity  Goal: Skin integrity is maintained or improved  Outcome: Met     Problem: Fall Risk  Goal: Patient will remain free from falls  Outcome: Met     Problem: Pain - Standard  Goal: Alleviation of pain or a reduction in pain to the patient’s comfort goal  Outcome: Met     Problem: Hemodynamics  Goal: Patient's hemodynamics, fluid balance and neurologic status will be stable or improve  Outcome: Met     Problem: Fluid Volume  Goal: Fluid volume balance will be maintained  Outcome: Met     Problem: Urinary - Renal Perfusion  Goal: Ability to achieve and maintain adequate renal perfusion and functioning will improve  Outcome: Met     Problem: Respiratory  Goal: Patient will achieve/maintain optimum respiratory ventilation and gas exchange  Outcome: Met     Problem: Mechanical Ventilation  Goal: Safe management of artificial airway and ventilation  Outcome: Met  Goal: Successful weaning off mechanical ventilator, spontaneously maintains adequate gas exchange  Outcome: Met  Goal: Patient will be able to express needs and understand communication  Outcome: Met     Problem: Physical Regulation  Goal: Diagnostic test results will  improve  Outcome: Met  Goal: Signs and symptoms of infection will decrease  Outcome: Met

## 2023-08-15 NOTE — CONSULTS
Diabetes education: Pt has a hx of diabetes on oral agents prior to admit. Pt was admitted with blood sugar of 387, and Hga1c of 14.1%. Pt is on a full liquid diet, with Glargine 20 units BID and regular insulin per sliding scale coverage every six hours ( please change to ac and hs when diet increased). Blood sugars are 178 ( 3 units, 233 ( 4 units), and 268 ( 7 units). Pt states he is still in pain.  Plan: CDE will continue to follow and work on insulin skills ( pt has a meter and tests blood sugars at home), teach pens and low blood sugar. Please have pt give  his insulin with nursing.

## 2023-08-15 NOTE — DISCHARGE PLANNING
Case Management Discharge Planning    Admission Date: 8/11/2023  GMLOS: 4.6  ALOS: 3    6-Clicks ADL Score: 24  6-Clicks Mobility Score: 24      Anticipated Discharge Dispo: Discharge Disposition: Discharged to home/self care (01) with close OP Follow Up    DME Needed: No    Action(s) Taken: Updated Provider/Nurse on Discharge Plan    This RN CM assisted Pt with Approved Service for Medication as requested  for the ff: Lantus- $7.64 and Insulin Pen Needles- $16.30 and the total cost is $23.94.  ASF faxed to [facility].    Escalations Completed: None    Medically Clear: No    Next Steps:   CM to continue to assist Pt with discharge as needed    Barriers to Discharge:   Medical clearance    Is the patient up for discharge tomorrow: No

## 2023-08-15 NOTE — PROGRESS NOTES
Discharge instructions reviewed with patient. Answered all questions. Discussed needing to schedule his follow up appointment. Reviewed lantus mediation as well as using the pen. Emmy from diabetes education still needing to review the pen with patient will be down to the discharge lounge to educate patient further.

## 2023-08-15 NOTE — PROGRESS NOTES
PIV has been removed. Gauze and tape applied to site. Patient tolerated well. Discharge medications have been delivered to patient and discussed with him. Patient has taken a shower and gotten dressed. Family is at bedside. DCL protocol initiated.

## 2023-08-15 NOTE — PROGRESS NOTES
Bedside report received.  Assessment complete.  A&O x 4. Patient calls appropriately.  Patient ambulates with standby assist.  Patient has 0/10 pain. Declined intervention at this time.  Tolerating CHO/full liquid diet. Denies N/V.  + void, + flatus, + BM. Last BM 8/13.  SCD's on.  Reviewed plan of care with patient. Call light and personal belongings within reach. Hourly rounding in place. All needs met at this time

## 2023-08-15 NOTE — PROGRESS NOTES
Diabetes education: Met with pt prior to discharge for discharge tye. Keisha had reviewed insulin pen needles prior. BOBBY had pt practice with saline pens and practice device. Hypoglycemia also reviewed. Education and pen handout all given in Niuean, prior to pt leaving. Pt has a box of 5 lantus pens. Storage, shelf life and site rotation also reviewed.

## 2023-08-15 NOTE — PROGRESS NOTES
DATE: 8/15/2023    Hospital Day 4 Surgical consult for SBO    INTERVAL EVENTS:  Denies abdominal pain, denies n/v.  +BM  No surgical indication at this time, ok to DC home from surgical standpoint.     REVIEW OF SYSTEMS:  Review of Systems   HENT:  Positive for sore throat.    Eyes: Negative.    Cardiovascular: Negative.    Gastrointestinal:  Negative for nausea and vomiting.       PHYSICAL EXAMINATION:  Vital Signs: /68   Pulse 65   Temp 36.6 °C (97.9 °F) (Temporal)   Resp 17   Wt 113 kg (250 lb)   SpO2 92%     Abdomen is protuberant, soft, non tender.     LABORATORY VALUES:   Recent Labs     08/13/23  0450   WBC 9.8   RBC 5.91   HEMOGLOBIN 16.4   HEMATOCRIT 51.0   MCV 86.3   MCH 27.7   MCHC 32.2*   RDW 43.3   PLATELETCT 207   MPV 11.8       Recent Labs     08/13/23  0450 08/14/23  1001 08/15/23  0026   SODIUM 147* 140 138   POTASSIUM 3.3* 4.2 3.6   CHLORIDE 106 107 106   CO2 31 23 24   GLUCOSE 268* 227* 184*   BUN 20 11 7*   CREATININE 0.45* 0.31* 0.35*   CALCIUM 7.6* 7.6* 8.0*       Recent Labs     08/13/23  0450   ASTSGOT 25   ALTSGPT 38   TBILIRUBIN 1.0   ALKPHOSPHAT 85   GLOBULIN 2.8       ASSESSMENT AND PLAN:    42 y.o. male, hospital day 4 for small bowel obstruction.  No surgical indication at this time. Patient is passing bowel movements and tolerating diet. Ok to discharge home from surgery standpoint.     Discussed patient condition with RN, Patient, and general surgery.

## 2023-08-15 NOTE — CARE PLAN
The patient is Stable - Low risk of patient condition declining or worsening    Shift Goals  Clinical Goals: tolerate diet, monitor for nausea  Patient Goals: recover  Family Goals: go home    Progress made toward(s) clinical / shift goals: Patient tolerating diet at this time, denying nausea.

## 2023-08-15 NOTE — PROGRESS NOTES
Diabetes education: Met with pt this afternoon. Please see consult note.  Plan: CDE will continue to follow and work on insulin skills ( pt has a meter and tests blood sugars at home), teach pens and low blood sugar. Please have pt give  his insulin with nursing.

## 2023-08-15 NOTE — DISCHARGE SUMMARY
Discharge Summary    CHIEF COMPLAINT ON ADMISSION  Chief Complaint   Patient presents with    Abdominal Pain     Pt c/o generalized abdominal pain that began yesterday, s/s heart burn, subjective fever.        Reason for Admission  Constipation; Abdominal Pain     Admission Date  8/11/2023    CODE STATUS  Full Code    HPI & HOSPITAL COURSE  43yo PMHx HTN, DM, HLD.  Surgical history significant for ruptured appendicitis and perforated bowel which the patient suffered on the same occasion when he was in his early teens.  He had a partial bowel resection and a colostomy which was later reversed.  He has had no abdominal surgery since.  Presented on 8/10 with abd pain.  he was noted to be in SVT which converted with adenosine.  CT showed gastroenteritis and Dc'd home from the ED. he presented back on 8/11 for the same issue and found to have acute distal SBO   Patient was admitted and evaluated by surgery he had NG tube placed and subsequently improved with conservative management.  NG was discontinued on 8/14/2023 the patient has been tolerating his diet.  On my evaluation this morning he denies any nausea or abdominal pain.  He has had bowel movements his abdominal exam is benign.  I discussed with surgery and he is medically clear for discharge from their standpoint.  The patient's diabetes is uncontrolled with a hemoglobin A1c of 14.1.  He received diabetic teaching and he will be discharged on Lantus in addition to his metformin.  I reviewed with him the importance of close follow-up with his primary care provider Dr. Raymond  with a CBG log    Therefore, he is discharged in good and stable condition to home with close outpatient follow-up.    The patient met 2-midnight criteria for an inpatient stay at the time of discharge.    Discharge Date  8/15/2023    FOLLOW UP ITEMS POST DISCHARGE  Follow-up with PCP with CBG log and adjust insulin accordingly    DISCHARGE DIAGNOSES  Principal Problem (Resolved):    Small  bowel obstruction (HCC) (POA: Yes)  Active Problems:    Hyperlipidemia (POA: Unknown)    Hypertension (POA: Unknown)    Diabetes mellitus (HCC) (POA: Unknown)  Resolved Problems:    Lactic acidosis (POA: Unknown)    Leukocytosis (POA: Unknown)    Severe sepsis (HCC) (POA: Unknown)    Hypokalemia (POA: Unknown)    Hypernatremia (POA: Unknown)      FOLLOW UP  No future appointments.  [facility]  [address]  [facility] [address]  [phone]  Follow up  As needed    James Raymond M.D.  [address] [facility] [id number] [phone]    Call in 1 week(s)  Left message with James Raymond M.D. office. The  will call you to schedule your follow up appointment. If you do not hear in 1 week please call the office to schedule.      MEDICATIONS ON DISCHARGE     Medication List        START taking these medications        Instructions   Lantus SoloStar 100 UNIT/ML Sopn injection  Generic drug: insulin glargine   Doctor's comments: Approved services  Inject 35 Units under the skin every evening.  Dose: 35 Units     TRUEplus 5-Bevel Pen Needles  Generic drug: Insulin Pen Needle 32 G x 4 mm   Doctor's comments: Per patient/formulary preference. ICD-10 code: E11.65 Uncontrolled type 2 Diabetes Mellitus  Use one pen needle in pen device to inject insulin once daily.            CONTINUE taking these medications        Instructions   lisinopril 10 MG Tabs  Commonly known as: Prinivil   Take 10 mg by mouth every day.  Dose: 10 mg     metformin 1000 MG tablet  Commonly known as: Glucophage   Take 1,000 mg by mouth 2 times a day.  Dose: 1,000 mg     omeprazole 20 MG delayed-release capsule  Commonly known as: PriLOSEC   Take 1 Capsule by mouth every day for 90 days.  Dose: 20 mg     ondansetron 4 MG Tbdp  Commonly known as: Zofran ODT   Take 1 Tablet by mouth every 6 hours as needed for Nausea/Vomiting.  Dose: 4 mg     rosuvastatin 20 MG Tabs  Commonly known as: Crestor   Take 20 mg by mouth  every day.  Dose: 20 mg            STOP taking these medications      glipiZIDE 10 MG Tabs  Commonly known as: Glucotrol     lovastatin 20 MG Tabs  Commonly known as: Mevacor              Allergies  No Known Allergies    DIET  Orders Placed This Encounter   Procedures    Diet Order Diet: Full Liquid     Standing Status:   Standing     Number of Occurrences:   1     Order Specific Question:   Diet:     Answer:   Full Liquid [11]       ACTIVITY  As tolerated.  Weight bearing as tolerated    CONSULTATIONS  Surgery    PROCEDURES  none    LABORATORY  Lab Results   Component Value Date    SODIUM 138 08/15/2023    POTASSIUM 3.6 08/15/2023    CHLORIDE 106 08/15/2023    CO2 24 08/15/2023    GLUCOSE 184 (H) 08/15/2023    BUN 7 (L) 08/15/2023    CREATININE 0.35 (L) 08/15/2023        Lab Results   Component Value Date    WBC 9.8 08/13/2023    HEMOGLOBIN 16.4 08/13/2023    HEMATOCRIT 51.0 08/13/2023    PLATELETCT 207 08/13/2023        Total time of the discharge process exceeds 35 minutes.

## 2023-08-15 NOTE — DISCHARGE INSTRUCTIONS
Diet    Full Liquid Diet for an additional day (until 8/17/2023) per Dr. Robbi Hamm.     A Full Liquid Diet refers to fluids and foods that are liquid or will become liquid at room temperature.  This diet should only be used for a short period of time to help you recover from illness or surgery.  To make sure you get enough calories and nutrients, eat 3 meals each day with snacks between. Drink premade nutrition supplement shakes or add protein powder to homemade shakes.      Diet    Diabetic Diet     A Diabetic Diet can help you control your blood glucose, lower your risk of heart disease, improve your blood pressure and maintain a healthy weight.  Eating healthy foods, low in calories, fat and carbohydrates at the same time every day can help control your blood glucose. Carbohydrates can greatly affect your blood glucose levels.  Counting carbs is important to keep your blood glucose at a healthy level, especially if you use insulin or take certain oral diabetes medicines.  Avoid alcohol as it can cause a sudden decrease in blood glucose (hypoglycemia), especially if you use insulin or take certain oral diabetes medicines.    Activity    Resume Your Normal Activity    You may resume your normal activity as tolerated.  Rest as needed.        Discharge Instructions    Discharged to home by car with relative. Discharged via wheelchair, hospital escort: Yes.  Special equipment needed: Not Applicable    Be sure to schedule a follow-up appointment with your primary care doctor or any specialists as instructed.     Discharge Plan:        I understand that a diet low in cholesterol, fat, and sodium is recommended for good health. Unless I have been given specific instructions below for another diet, I accept this instruction as my diet prescription.   Other diet: liquids and increase as tolerating     Special Instructions: None    -Is this patient being discharged with medication to prevent blood clots?  No    Is patient  discharged on Warfarin / Coumadin?   No

## 2023-08-17 LAB
BACTERIA BLD CULT: NORMAL
BACTERIA BLD CULT: NORMAL
SIGNIFICANT IND 70042: NORMAL
SIGNIFICANT IND 70042: NORMAL
SITE SITE: NORMAL
SITE SITE: NORMAL
SOURCE SOURCE: NORMAL
SOURCE SOURCE: NORMAL

## 2023-08-18 NOTE — DOCUMENTATION QUERY
"                                                                         [facility]                                                                       Query Response Note      PATIENT:               ERICA MA  ACCT #:                  [id number]  MRN:                     [id number]  :                      1980  ADMIT DATE:       2023 8:47 PM  DISCH DATE:        8/15/2023 11:36 AM  RESPONDING  PROVIDER #:        [id number]           QUERY TEXT:    Severe sepsis is documented in -  progress notes and on DCS.     Patient admitted  with SBO. 2/4 SIRS on admission with WBC greater than 12 and HR greater than 90.     -On ,  patient developed increasing oxygen demands, increased tachycardia and increasing lactic acid.   Treated with IV fluids, IV abx, supplemental oxygen  and transfer to [facility].   -Per  progress note, lactic acidosis likely 2/2 SBO - resolved.  -Per  progress note, leukocytosis likely reactive,  abx discontinued and documentation notes \"no sign of infection\"      Please clarify the status of sepsis by providing suspected/confirmed source and  sepsis-related organ dysfunction.      Sepsis - real or suspected infection plus 2 or more SIRS criteria + organ dysfunction related to sepsis    Temp <96.8 or >101  HR >90  RR >20  WBC <4,000 or > 12,000  >10% bandemia         The patient's Clinical Indicators include:  Clinical Indicators: WBC: 22.9-->22.7-->9.08; Lactic Acid 2.2-->2.1-->3.1-->4.8-->1.5  Blood Cx: (-)     ED: ill appearing, SpO2 86% on room air   H&P: leukocytosis - likely reactive ; lactic acidosis - Likely secondary to SBO    progress note : HR 140s, improving with IVFs    progress note: WBC up to mid 20s but no sign of infection; DC IV CTX/FLagyl today      Risk Factors: SBO, Type 2 DM with hyperglycemia    Treatments: IV Fluids, IV abx, (discontinued after 2 days), labs, blood cultures, transfer to [facility],  Options provided:   -- " Sepsis ruled out after study   -- Sepsis exists, (provide suspected/confirmed source and sepsis-related organ dysfunction)   -- Other explanation, (please specify other explanation)      Query created by: Amanda Trinidad on 8/18/2023 2:23 PM    RESPONSE TEXT:    Sepsis ruled out after study          Electronically signed by:  DELMA NOWAK MD 8/18/2023 3:19 PM
